# Patient Record
Sex: FEMALE | Race: WHITE | NOT HISPANIC OR LATINO | Employment: PART TIME | URBAN - NONMETROPOLITAN AREA
[De-identification: names, ages, dates, MRNs, and addresses within clinical notes are randomized per-mention and may not be internally consistent; named-entity substitution may affect disease eponyms.]

---

## 2017-10-16 ENCOUNTER — OFFICE VISIT (OUTPATIENT)
Dept: URGENT CARE | Facility: CLINIC | Age: 52
End: 2017-10-16
Payer: COMMERCIAL

## 2017-10-16 PROCEDURE — 99203 OFFICE O/P NEW LOW 30 MIN: CPT

## 2017-10-19 NOTE — PROGRESS NOTES
Assessment  1  Forearm sprain (841 9) ([id number])    Discussion/Summary  Discussion Summary:   1) rest armapply ice 10-20minutes at a timetylenol/motrin for painace bandage as needed for compression  Medication Side Effects Reviewed: Possible side effects of new medications were reviewed with the patient/guardian today  Understands and agrees with treatment plan: The treatment plan was reviewed with the patient/guardian  The patient/guardian understands and agrees with the treatment plan   Counseling Documentation With Imm: The patient, patient's family was counseled regarding instructions for management,-- patient and family education,-- importance of compliance with treatment  Chief Complaint  1  Arm Pain    History of Present Illness  HPI: 46yo F p/w pain in R forearm x 1 day  Pt carries large bag on that arm  Pt states she hears clicking nose when she rotates her arm  Pt has not attempted any palliative treatments  Hospital Based Practices Required Assessment:   Pain Assessment   the patient states they have pain  The pain is located in the right forearm  The patient describes the pain as popping  (on a scale of 0 to 10, the patient rates the pain at 5 )   Abuse And Domestic Violence Screen    Yes, the patient is safe at home  -- The patient states no one is hurting them  Depression And Suicide Screen  No, the patient has not had thoughts of hurting themself  No, the patient has not felt depressed in the past 7 days  Prefered Language is  [place]  Primary Language is  English  Review of Systems  Focused-Female:   Constitutional: No fever, no chills, feels well, no tiredness, no recent weight gain or loss  ENT: no ear ache, no loss of hearing, no nosebleeds or nasal discharge, no sore throat or hoarseness  Cardiovascular: no complaints of slow or fast heart rate, no chest pain, no palpitations, no leg claudication or lower extremity edema     Respiratory: no complaints of shortness of breath, no wheezing, no dyspnea on exertion, no orthopnea or PND  Breasts: no complaints of breast pain, breast lump or nipple discharge  Gastrointestinal: no complaints of abdominal pain, no constipation, no nausea or diarrhea, no vomiting, no bloody stools  Genitourinary: no complaints of dysuria, no incontinence, no pelvic pain, no dysmenorrhea, no vaginal discharge or abnormal vaginal bleeding  Musculoskeletal: arthralgias-- and-- myalgias, but-- no joint swelling,-- no limb pain,-- no joint stiffness-- and-- no limb swelling  Integumentary: no complaints of skin rash or lesion, no itching or dry skin, no skin wounds  Neurological: no complaints of headache, no confusion, no numbness or tingling, no dizziness or fainting  ROS Reviewed:   ROS reviewed  Active Problems  1  Acute upper respiratory infection (465 9) (J06 9)   2  Anxiety disorder (300 00) (F41 9)   3  Cough (786 2) (R05)   4  Hypothyroidism (244 9) (E03 9)   5  Panic disorder (300 01) (F41 0)    Past Medical History  1  History of Cough (786 2) (R05)   2  History of acute bronchitis (V12 69) (Z87 09)   3  No pertinent past medical history  Active Problems And Past Medical History Reviewed: The active problems and past medical history were reviewed and updated today  Family History  Family History Reviewed: The family history was reviewed and updated today  Social History   · Current every day smoker (305 1) (F17 200)   · No alcohol use   · No illicit drug use  Social History Reviewed: The social history was reviewed and is unchanged  Surgical History  1  History of Hysterectomy   2  History of Tonsillectomy  Surgical History Reviewed: The surgical history was reviewed and updated today  Current Meds   1  Synthroid TABS; Therapy: (Recorded:[id number]) to Recorded  Medication List Reviewed: The medication list was reviewed and updated today  Allergies  1   No Known Drug Allergies    Vitals  Signs Recorded: 45ZSJ1731 02:08PM   Temperature: 97 2 F  Heart Rate: 85  Respiration: 20  Systolic: 356, LUE, Sitting  Diastolic: 83, LUE, Sitting  BP Cuff Size: Large  Height: 5 ft 7 in  Weight: 176 lb 4 oz  BMI Calculated: 27 6  BSA Calculated: 1 92  O2 Saturation: 99  Pain Scale: 5    Physical Exam    Constitutional   General appearance: No acute distress, well appearing and well nourished  Pulmonary   Respiratory effort: No increased work of breathing or signs of respiratory distress  Auscultation of lungs: Clear to auscultation  no rales or crackles were heard bilaterally  no rhonchi  no friction rub  no wheezing  Cardiovascular   Palpation of heart: Normal PMI, no thrills  Auscultation of heart: Normal rate and rhythm, normal S1 and S2, without murmurs  Examination of extremities for edema and/or varicosities: Normal     Abdomen   Abdomen: Non-tender, no masses  Liver and spleen: No hepatomegaly or splenomegaly  Musculoskeletal   Gait and station: Normal     Digits and nails: Normal without clubbing or cyanosis  Inspection/palpation of joints, bones, and muscles: Abnormal  -- mild edema and TTP over proximal aspect of R forearm  Skin   Skin and subcutaneous tissue: Normal without rashes or lesions  Neurologic   Cranial nerves: Cranial nerves 2-12 intact  Reflexes: 2+ and symmetric  Sensation: No sensory loss  -- pt vascularly and neurologically intact     Psychiatric   Orientation to person, place, and time: Normal     Mood and affect: Normal        Signatures   Electronically signed by : GLENDA Buitrago; Oct 17 2017  9:27AM EST                       (Author)    Electronically signed by : Melecio Kussmaul, D O ; Oct 18 2017 10:21AM EST                       (Co-author)

## 2018-01-29 ENCOUNTER — OFFICE VISIT (OUTPATIENT)
Dept: URGENT CARE | Facility: CLINIC | Age: 53
End: 2018-01-29
Payer: COMMERCIAL

## 2018-01-29 ENCOUNTER — APPOINTMENT (OUTPATIENT)
Dept: RADIOLOGY | Facility: CLINIC | Age: 53
End: 2018-01-29
Payer: COMMERCIAL

## 2018-01-29 VITALS
BODY MASS INDEX: 27.47 KG/M2 | TEMPERATURE: 98.4 F | RESPIRATION RATE: 18 BRPM | HEART RATE: 94 BPM | WEIGHT: 175 LBS | SYSTOLIC BLOOD PRESSURE: 112 MMHG | DIASTOLIC BLOOD PRESSURE: 70 MMHG | HEIGHT: 67 IN | OXYGEN SATURATION: 98 %

## 2018-01-29 DIAGNOSIS — R07.9 CHEST PAIN, UNSPECIFIED TYPE: ICD-10-CM

## 2018-01-29 DIAGNOSIS — R05.9 COUGH: ICD-10-CM

## 2018-01-29 DIAGNOSIS — J20.9 ACUTE BRONCHITIS, UNSPECIFIED ORGANISM: Primary | ICD-10-CM

## 2018-01-29 PROCEDURE — 93005 ELECTROCARDIOGRAM TRACING: CPT | Performed by: PHYSICIAN ASSISTANT

## 2018-01-29 PROCEDURE — G0383 LEV 4 HOSP TYPE B ED VISIT: HCPCS | Performed by: PHYSICIAN ASSISTANT

## 2018-01-29 PROCEDURE — 71046 X-RAY EXAM CHEST 2 VIEWS: CPT

## 2018-01-29 RX ORDER — AMOXICILLIN 400 MG/5ML
10 POWDER, FOR SUSPENSION ORAL 2 TIMES DAILY
Qty: 200 ML | Refills: 0 | Status: SHIPPED | OUTPATIENT
Start: 2018-01-29 | End: 2018-02-08

## 2018-01-29 RX ORDER — LEVOTHYROXINE SODIUM 137 UG/1
137 TABLET ORAL DAILY
COMMUNITY
End: 2018-08-17

## 2018-01-29 NOTE — PROGRESS NOTES
Assessment/Plan:      Diagnoses and all orders for this visit:    Acute bronchitis, unspecified organism  -     nystatin (MYCOSTATIN) 100,000 units/mL suspension; Apply 5 mL (500,000 Units total) to the mouth or throat 4 (four) times a day  -     amoxicillin (AMOXIL) 400 MG/5ML suspension; Take 10 mL (800 mg total) by mouth 2 (two) times a day for 10 days    Chest pain, unspecified type  Comments:  EKG showed normal sinus rhythm at 83 beats per minute  Reviewed prior EKG from September of 2015 and there appears to be no significant change from prior  Orders:  -     POCT ECG    Cough  -     X-ray chest 2 views    Other orders  -     levothyroxine (SYNTHROID) 137 mcg tablet; Take by mouth        Patient Instructions   X-ray negative for any pulmonary disease  Will follow up with x-ray report when available  Start antibiotic intake as directed  Prescribed nystatin swish and swallow for patient because she has thrush when she takes an antibiotic  Patient is unable swallow pills so prescribe liquid for her  Chest pain appears more muscular in nature  Patient informed to go to the emergency room if pain is worsening  Otherwise follow up with PCP in the next 7-10 days  Subjective:     Patient ID: Luc Estes is a 47 y o  female  Chief Complaint   Patient presents with    Chest Pain     C/O mid sternal chest pain which radiated into right shoulder blade and alot of gas 3 days ago  The pain has improved from a stabbing pain to a soreness today  pt has now noticed some head and chest congestion and loose cough  79-year-old female here with complaint of midsternal sharp chest pain which radiated to her right shoulder blade that started 3 days ago  She states that the pain is lessening and now feels just sore in that area  Reports having chest congestion and a loose cough  No fever or chills  No palpitations, diaphoresis, nausea or vomiting          Review of Systems   Constitutional: Negative for activity change, appetite change, chills, diaphoresis, fatigue, fever and unexpected weight change  HENT: Positive for congestion  Negative for dental problem, hearing loss, sinus pressure, sneezing, sore throat, tinnitus, trouble swallowing and voice change  Eyes: Negative for photophobia, redness and visual disturbance  Respiratory: Positive for cough  Negative for apnea, chest tightness, shortness of breath, wheezing and stridor  Cardiovascular: Positive for chest pain  Negative for palpitations and leg swelling  Gastrointestinal: Negative for abdominal distention, abdominal pain, blood in stool, constipation, diarrhea, nausea and vomiting  Endocrine: Negative for cold intolerance, heat intolerance, polydipsia, polyphagia and polyuria  Genitourinary: Negative for difficulty urinating, dysuria, flank pain, frequency, hematuria and urgency  Musculoskeletal: Negative for arthralgias, back pain, gait problem, joint swelling, myalgias, neck pain and neck stiffness  Skin: Negative for pallor, rash and wound  Neurological: Negative for dizziness, tremors, seizures, speech difficulty, weakness and headaches  Hematological: Negative for adenopathy  Does not bruise/bleed easily  Psychiatric/Behavioral: Negative for agitation, confusion, dysphoric mood and sleep disturbance  The patient is not nervous/anxious  All other systems reviewed and are negative  Objective:  /70   Pulse 94   Temp 98 4 °F (36 9 °C)   Resp 18   Ht 5' 7" (1 702 m)   Wt 79 4 kg (175 lb)   SpO2 98%   BMI 27 41 kg/m²      Physical Exam   Constitutional: She appears well-developed and well-nourished  No distress  HENT:   Head: Normocephalic  Right Ear: External ear normal    Left Ear: External ear normal    Nose: Nose normal    Mouth/Throat: Oropharynx is clear and moist  No oropharyngeal exudate  Neck: Normal range of motion  Neck supple     Cardiovascular: Normal rate, regular rhythm and normal heart sounds  No murmur heard  Pulmonary/Chest: Effort normal  No respiratory distress  She has wheezes in the right upper field, the right middle field and the right lower field  She has no rales  Abdominal: Soft  Bowel sounds are normal  There is no tenderness  Musculoskeletal: Normal range of motion  Lymphadenopathy:     She has no cervical adenopathy  Skin: Skin is warm  No rash noted

## 2018-01-29 NOTE — PATIENT INSTRUCTIONS
X-ray negative for any pulmonary disease  Will follow up with x-ray report when available  Start antibiotic intake as directed  Prescribed nystatin swish and swallow for patient because she has thrush when she takes an antibiotic  Patient is unable swallow pills so prescribe liquid for her  Chest pain appears more muscular in nature  Patient informed to go to the emergency room if pain is worsening  Otherwise follow up with PCP in the next 7-10 days

## 2018-01-31 LAB
ATRIAL RATE: 83 BPM
P AXIS: 66 DEGREES
PR INTERVAL: 150 MS
QRS AXIS: 20 DEGREES
QRSD INTERVAL: 76 MS
QT INTERVAL: 378 MS
QTC INTERVAL: 444 MS
T WAVE AXIS: 40 DEGREES
VENTRICULAR RATE: 83 BPM

## 2018-02-08 ENCOUNTER — TRANSCRIBE ORDERS (OUTPATIENT)
Dept: LAB | Facility: MEDICAL CENTER | Age: 53
End: 2018-02-08

## 2018-02-08 ENCOUNTER — APPOINTMENT (OUTPATIENT)
Dept: LAB | Facility: MEDICAL CENTER | Age: 53
End: 2018-02-08
Payer: COMMERCIAL

## 2018-02-08 DIAGNOSIS — F41.9 ANXIETY: ICD-10-CM

## 2018-02-08 DIAGNOSIS — F41.9 ANXIETY: Primary | ICD-10-CM

## 2018-02-08 LAB
ANION GAP SERPL CALCULATED.3IONS-SCNC: 6 MMOL/L (ref 4–13)
BUN SERPL-MCNC: 17 MG/DL (ref 5–25)
CALCIUM SERPL-MCNC: 9.1 MG/DL (ref 8.3–10.1)
CHLORIDE SERPL-SCNC: 107 MMOL/L (ref 100–108)
CO2 SERPL-SCNC: 25 MMOL/L (ref 21–32)
CREAT SERPL-MCNC: 0.98 MG/DL (ref 0.6–1.3)
GFR SERPL CREATININE-BSD FRML MDRD: 66 ML/MIN/1.73SQ M
GLUCOSE SERPL-MCNC: 101 MG/DL (ref 65–140)
POTASSIUM SERPL-SCNC: 3.9 MMOL/L (ref 3.5–5.3)
SODIUM SERPL-SCNC: 138 MMOL/L (ref 136–145)
TSH SERPL DL<=0.05 MIU/L-ACNC: 1.41 UIU/ML (ref 0.36–3.74)

## 2018-02-08 PROCEDURE — 80048 BASIC METABOLIC PNL TOTAL CA: CPT

## 2018-02-08 PROCEDURE — 36415 COLL VENOUS BLD VENIPUNCTURE: CPT

## 2018-02-08 PROCEDURE — 84443 ASSAY THYROID STIM HORMONE: CPT

## 2018-03-16 ENCOUNTER — OFFICE VISIT (OUTPATIENT)
Dept: URGENT CARE | Facility: CLINIC | Age: 53
End: 2018-03-16
Payer: COMMERCIAL

## 2018-03-16 VITALS
HEIGHT: 67 IN | WEIGHT: 175.04 LBS | BODY MASS INDEX: 27.47 KG/M2 | HEART RATE: 82 BPM | DIASTOLIC BLOOD PRESSURE: 78 MMHG | OXYGEN SATURATION: 97 % | SYSTOLIC BLOOD PRESSURE: 122 MMHG | RESPIRATION RATE: 18 BRPM | TEMPERATURE: 96.7 F

## 2018-03-16 DIAGNOSIS — J01.10 ACUTE FRONTAL SINUSITIS, RECURRENCE NOT SPECIFIED: Primary | ICD-10-CM

## 2018-03-16 PROCEDURE — G0382 LEV 3 HOSP TYPE B ED VISIT: HCPCS | Performed by: NURSE PRACTITIONER

## 2018-03-16 RX ORDER — AMOXICILLIN 400 MG/5ML
POWDER, FOR SUSPENSION ORAL
Qty: 200 ML | Refills: 0 | Status: SHIPPED | OUTPATIENT
Start: 2018-03-16 | End: 2018-03-16

## 2018-03-16 NOTE — PROGRESS NOTES
[facility]        NAME: Regla Abrams is a 47 y o  female  : 1965    MRN: [id number]  DATE: 2018  TIME: 1:21 PM    Assessment and Plan   Acute frontal sinusitis, recurrence not specified [J01 10]  1  Acute frontal sinusitis, recurrence not specified  amoxicillin (AMOXIL) 400 MG/5ML suspension         Patient Instructions       Follow up with PCP in 3-5 days  Proceed to  ER if symptoms worsen  I have prescribed an antibiotic for the infection  Please take the antibiotic as prescribed and finish the entire prescription  I recommend that the patient takes an over the counter probiotic or eats yogurt with live cultures in it [place]) to keep good bacteria in the gut and help prevent diarrhea  Wash hands frequently to prevent the spread of infection  Can use over the counter cough and cold medications to help with symptoms  Ibuprofen and/or tylenol as needed for pain or fever  If not improving over the next 7-10 days, follow up with PCP  Chief Complaint     Chief Complaint   Patient presents with    Cold Like Symptoms     C/O pain in left thoracic area x 4 days, harsh cough, sinus pressure, post nasal drip, nausea,and diarrhea x 2 days         History of Present Illness       24-year-old female at urgent care with chief complaint of sinus pressure, sinus congestion, postnasal drainage and chills for the past 4 days  Also states she has cough the past 4 days denies any shortness of breath or chest congestion  But is complaining of left sided mid back pain which she states is worse after coughing  Has not used any over-the-counter medications reports no improvement in symptoms        Review of Systems   Review of Systems   Constitutional: Negative  HENT: Positive for congestion, postnasal drip, rhinorrhea, sinus pain, sinus pressure and sore throat   Negative for dental problem, drooling, ear discharge, ear pain, facial swelling, hearing loss, mouth sores, nosebleeds, sneezing, tinnitus, trouble swallowing and voice change  Eyes: Negative  Respiratory: Positive for cough  Negative for apnea, choking, chest tightness, shortness of breath, wheezing and stridor  Cardiovascular: Negative for chest pain, palpitations and leg swelling  Gastrointestinal: Negative  Negative for abdominal distention, abdominal pain, anal bleeding, blood in stool, constipation, diarrhea, nausea, rectal pain and vomiting  Endocrine: Negative  Genitourinary: Negative  Musculoskeletal: Positive for back pain  Skin: Negative  Allergic/Immunologic: Negative  Neurological: Negative  Hematological: Negative  Psychiatric/Behavioral: Negative  Current Medications       Current Outpatient Prescriptions:     amoxicillin (AMOXIL) 400 MG/5ML suspension, Take 10 ml po bid for 10 days, Disp: 200 mL, Rfl: 0    levothyroxine (SYNTHROID) 137 mcg tablet, Take by mouth, Disp: , Rfl:     Current Allergies     Allergies as of 03/16/2018    (No Known Allergies)            The following portions of the patient's history were reviewed and updated as appropriate: allergies, current medications, past family history, past medical history, past social history, past surgical history and problem list      Past Medical History:   Diagnosis Date    Asthma     Hypothyroid     IBS (irritable bowel syndrome)        Past Surgical History:   Procedure Laterality Date    HYSTERECTOMY      TONSILLECTOMY         No family history on file  Medications have been verified  Objective   /78   Pulse 82   Temp (!) 96 7 °F (35 9 °C)   Resp 18   Ht 5' 7 01" (1 702 m)   Wt 79 4 kg (175 lb 0 7 oz)   SpO2 97%   BMI 27 41 kg/m²        Physical Exam     Physical Exam   Constitutional: She is oriented to person, place, and time  Vital signs are normal  She appears well-developed and well-nourished  HENT:   Head: Normocephalic and atraumatic     Right Ear: Hearing, tympanic membrane, external ear and ear canal normal    Left Ear: Hearing, tympanic membrane, external ear and ear canal normal    Nose: Rhinorrhea and sinus tenderness present  Right sinus exhibits frontal sinus tenderness  Left sinus exhibits frontal sinus tenderness  Mouth/Throat: Uvula is midline and mucous membranes are normal  Posterior oropharyngeal erythema present  Eyes: Conjunctivae and EOM are normal  Pupils are equal, round, and reactive to light  Neck: Trachea normal, normal range of motion and full passive range of motion without pain  Cardiovascular: Normal rate and regular rhythm  Pulmonary/Chest: Effort normal and breath sounds normal    Abdominal: Soft  Normal appearance  Musculoskeletal: Normal range of motion  Back:    Lymphadenopathy:     She has cervical adenopathy  Right cervical: Superficial cervical adenopathy present  Left cervical: Superficial cervical adenopathy present  Neurological: She is alert and oriented to person, place, and time

## 2018-08-17 ENCOUNTER — HOSPITAL ENCOUNTER (OUTPATIENT)
Facility: HOSPITAL | Age: 53
Setting detail: OBSERVATION
Discharge: HOME | End: 2018-08-18
Attending: SURGERY | Admitting: SURGERY
Payer: COMMERCIAL

## 2018-08-17 ENCOUNTER — ANESTHESIA (EMERGENCY)
Dept: PERIOP | Facility: HOSPITAL | Age: 53
End: 2018-08-17
Payer: COMMERCIAL

## 2018-08-17 ENCOUNTER — APPOINTMENT (EMERGENCY)
Dept: CT IMAGING | Facility: HOSPITAL | Age: 53
End: 2018-08-17
Payer: COMMERCIAL

## 2018-08-17 ENCOUNTER — HOSPITAL ENCOUNTER (EMERGENCY)
Facility: HOSPITAL | Age: 53
End: 2018-08-17
Attending: EMERGENCY MEDICINE | Admitting: EMERGENCY MEDICINE
Payer: COMMERCIAL

## 2018-08-17 ENCOUNTER — ANESTHESIA EVENT (EMERGENCY)
Dept: PERIOP | Facility: HOSPITAL | Age: 53
End: 2018-08-17
Payer: COMMERCIAL

## 2018-08-17 VITALS
DIASTOLIC BLOOD PRESSURE: 65 MMHG | RESPIRATION RATE: 18 BRPM | WEIGHT: 165.12 LBS | SYSTOLIC BLOOD PRESSURE: 109 MMHG | HEART RATE: 78 BPM | BODY MASS INDEX: 25.86 KG/M2 | OXYGEN SATURATION: 95 % | TEMPERATURE: 99.1 F

## 2018-08-17 DIAGNOSIS — K35.80 ACUTE APPENDICITIS: Primary | ICD-10-CM

## 2018-08-17 DIAGNOSIS — K35.30 ACUTE APPENDICITIS WITH LOCALIZED PERITONITIS: Primary | ICD-10-CM

## 2018-08-17 LAB
ALBUMIN SERPL BCP-MCNC: 3.9 G/DL (ref 3.5–5)
ALP SERPL-CCNC: 90 U/L (ref 46–116)
ALT SERPL W P-5'-P-CCNC: 20 U/L (ref 12–78)
ANION GAP SERPL CALCULATED.3IONS-SCNC: 8 MMOL/L (ref 4–13)
AST SERPL W P-5'-P-CCNC: 12 U/L (ref 5–45)
ATRIAL RATE: 79 BPM
BASOPHILS # BLD AUTO: 0.04 THOUSANDS/ΜL (ref 0–0.1)
BASOPHILS NFR BLD AUTO: 0 % (ref 0–1)
BILIRUB SERPL-MCNC: 0.3 MG/DL (ref 0.2–1)
BILIRUB UR QL STRIP: NEGATIVE
BUN SERPL-MCNC: 16 MG/DL (ref 5–25)
CALCIUM SERPL-MCNC: 9.3 MG/DL (ref 8.3–10.1)
CHLORIDE SERPL-SCNC: 104 MMOL/L (ref 100–108)
CLARITY UR: NORMAL
CO2 SERPL-SCNC: 26 MMOL/L (ref 21–32)
COLOR UR: YELLOW
CREAT SERPL-MCNC: 0.91 MG/DL (ref 0.6–1.3)
EOSINOPHIL # BLD AUTO: 0.03 THOUSAND/ΜL (ref 0–0.61)
EOSINOPHIL NFR BLD AUTO: 0 % (ref 0–6)
ERYTHROCYTE [DISTWIDTH] IN BLOOD BY AUTOMATED COUNT: 13.7 % (ref 11.6–15.1)
GFR SERPL CREATININE-BSD FRML MDRD: 72 ML/MIN/1.73SQ M
GLUCOSE SERPL-MCNC: 110 MG/DL (ref 65–140)
GLUCOSE UR STRIP-MCNC: NEGATIVE MG/DL
HCT VFR BLD AUTO: 42.6 % (ref 34.8–46.1)
HGB BLD-MCNC: 14 G/DL (ref 11.5–15.4)
HGB UR QL STRIP.AUTO: NEGATIVE
HOLD SPECIMEN: NORMAL
IMM GRANULOCYTES # BLD AUTO: 0.06 THOUSAND/UL (ref 0–0.2)
IMM GRANULOCYTES NFR BLD AUTO: 0 % (ref 0–2)
KETONES UR STRIP-MCNC: NEGATIVE MG/DL
LEUKOCYTE ESTERASE UR QL STRIP: NEGATIVE
LIPASE SERPL-CCNC: 170 U/L (ref 73–393)
LYMPHOCYTES # BLD AUTO: 2.24 THOUSANDS/ΜL (ref 0.6–4.47)
LYMPHOCYTES NFR BLD AUTO: 13 % (ref 14–44)
MCH RBC QN AUTO: 30.7 PG (ref 26.8–34.3)
MCHC RBC AUTO-ENTMCNC: 32.9 G/DL (ref 31.4–37.4)
MCV RBC AUTO: 93 FL (ref 82–98)
MONOCYTES # BLD AUTO: 1.07 THOUSAND/ΜL (ref 0.17–1.22)
MONOCYTES NFR BLD AUTO: 6 % (ref 4–12)
NEUTROPHILS # BLD AUTO: 13.52 THOUSANDS/ΜL (ref 1.85–7.62)
NEUTS SEG NFR BLD AUTO: 81 % (ref 43–75)
NITRITE UR QL STRIP: NEGATIVE
NRBC BLD AUTO-RTO: 0 /100 WBCS
P AXIS: 76 DEGREES
PH UR STRIP.AUTO: 6.5 [PH] (ref 4.5–8)
PLATELET # BLD AUTO: 316 THOUSANDS/UL (ref 149–390)
PMV BLD AUTO: 9.7 FL (ref 8.9–12.7)
POTASSIUM SERPL-SCNC: 3.8 MMOL/L (ref 3.5–5.3)
PR INTERVAL: 144 MS
PROT SERPL-MCNC: 7.2 G/DL (ref 6.4–8.2)
PROT UR STRIP-MCNC: NEGATIVE MG/DL
QRS AXIS: 37 DEGREES
QRSD INTERVAL: 76 MS
QT INTERVAL: 384 MS
QTC INTERVAL: 440 MS
RBC # BLD AUTO: 4.56 MILLION/UL (ref 3.81–5.12)
SODIUM SERPL-SCNC: 138 MMOL/L (ref 136–145)
SP GR UR STRIP.AUTO: 1.02 (ref 1–1.03)
T WAVE AXIS: 44 DEGREES
TROPONIN I SERPL-MCNC: <0.02 NG/ML
UROBILINOGEN UR QL STRIP.AUTO: 0.2 E.U./DL
VENTRICULAR RATE: 79 BPM
WBC # BLD AUTO: 16.96 THOUSAND/UL (ref 4.31–10.16)

## 2018-08-17 PROCEDURE — 84484 ASSAY OF TROPONIN QUANT: CPT | Performed by: EMERGENCY MEDICINE

## 2018-08-17 PROCEDURE — 88304 TISSUE EXAM BY PATHOLOGIST: CPT | Performed by: PATHOLOGY

## 2018-08-17 PROCEDURE — 96365 THER/PROPH/DIAG IV INF INIT: CPT

## 2018-08-17 PROCEDURE — 85025 COMPLETE CBC W/AUTO DIFF WBC: CPT | Performed by: EMERGENCY MEDICINE

## 2018-08-17 PROCEDURE — 99285 EMERGENCY DEPT VISIT HI MDM: CPT

## 2018-08-17 PROCEDURE — 81003 URINALYSIS AUTO W/O SCOPE: CPT | Performed by: EMERGENCY MEDICINE

## 2018-08-17 PROCEDURE — 96361 HYDRATE IV INFUSION ADD-ON: CPT

## 2018-08-17 PROCEDURE — 80053 COMPREHEN METABOLIC PANEL: CPT | Performed by: EMERGENCY MEDICINE

## 2018-08-17 PROCEDURE — 93005 ELECTROCARDIOGRAM TRACING: CPT

## 2018-08-17 PROCEDURE — 74177 CT ABD & PELVIS W/CONTRAST: CPT

## 2018-08-17 PROCEDURE — 93010 ELECTROCARDIOGRAM REPORT: CPT | Performed by: INTERNAL MEDICINE

## 2018-08-17 PROCEDURE — 83690 ASSAY OF LIPASE: CPT | Performed by: EMERGENCY MEDICINE

## 2018-08-17 PROCEDURE — 44970 LAPAROSCOPY APPENDECTOMY: CPT | Performed by: SURGERY

## 2018-08-17 PROCEDURE — 36415 COLL VENOUS BLD VENIPUNCTURE: CPT | Performed by: EMERGENCY MEDICINE

## 2018-08-17 PROCEDURE — 99219 PR INITIAL OBSERVATION CARE/DAY 50 MINUTES: CPT | Performed by: SURGERY

## 2018-08-17 PROCEDURE — 96374 THER/PROPH/DIAG INJ IV PUSH: CPT

## 2018-08-17 RX ORDER — METOCLOPRAMIDE HYDROCHLORIDE 5 MG/ML
10 INJECTION INTRAMUSCULAR; INTRAVENOUS ONCE AS NEEDED
Status: DISCONTINUED | OUTPATIENT
Start: 2018-08-17 | End: 2018-08-17 | Stop reason: HOSPADM

## 2018-08-17 RX ORDER — ONDANSETRON 2 MG/ML
INJECTION INTRAMUSCULAR; INTRAVENOUS AS NEEDED
Status: DISCONTINUED | OUTPATIENT
Start: 2018-08-17 | End: 2018-08-17 | Stop reason: SURG

## 2018-08-17 RX ORDER — MAGNESIUM HYDROXIDE 1200 MG/15ML
LIQUID ORAL AS NEEDED
Status: DISCONTINUED | OUTPATIENT
Start: 2018-08-17 | End: 2018-08-17 | Stop reason: HOSPADM

## 2018-08-17 RX ORDER — FENTANYL CITRATE 50 UG/ML
INJECTION, SOLUTION INTRAMUSCULAR; INTRAVENOUS AS NEEDED
Status: DISCONTINUED | OUTPATIENT
Start: 2018-08-17 | End: 2018-08-17 | Stop reason: SURG

## 2018-08-17 RX ORDER — ACETAMINOPHEN 160 MG/5ML
650 SUSPENSION, ORAL (FINAL DOSE FORM) ORAL ONCE
Status: COMPLETED | OUTPATIENT
Start: 2018-08-17 | End: 2018-08-17

## 2018-08-17 RX ORDER — SODIUM CHLORIDE 9 MG/ML
125 INJECTION, SOLUTION INTRAVENOUS CONTINUOUS
Status: DISCONTINUED | OUTPATIENT
Start: 2018-08-17 | End: 2018-08-17

## 2018-08-17 RX ORDER — SODIUM CHLORIDE, SODIUM LACTATE, POTASSIUM CHLORIDE, CALCIUM CHLORIDE 600; 310; 30; 20 MG/100ML; MG/100ML; MG/100ML; MG/100ML
INJECTION, SOLUTION INTRAVENOUS CONTINUOUS PRN
Status: DISCONTINUED | OUTPATIENT
Start: 2018-08-17 | End: 2018-08-17 | Stop reason: SURG

## 2018-08-17 RX ORDER — FENTANYL CITRATE/PF 50 MCG/ML
25 SYRINGE (ML) INJECTION
Status: DISCONTINUED | OUTPATIENT
Start: 2018-08-17 | End: 2018-08-17 | Stop reason: HOSPADM

## 2018-08-17 RX ORDER — DEXMEDETOMIDINE HYDROCHLORIDE 100 UG/ML
INJECTION, SOLUTION INTRAVENOUS AS NEEDED
Status: DISCONTINUED | OUTPATIENT
Start: 2018-08-17 | End: 2018-08-17 | Stop reason: SURG

## 2018-08-17 RX ORDER — NICOTINE 21 MG/24HR
21 PATCH, TRANSDERMAL 24 HOURS TRANSDERMAL ONCE AS NEEDED
Status: DISCONTINUED | OUTPATIENT
Start: 2018-08-17 | End: 2018-08-17 | Stop reason: HOSPADM

## 2018-08-17 RX ORDER — LEVOTHYROXINE SODIUM 0.1 MG/1
100 TABLET ORAL DAILY
COMMUNITY
End: 2018-12-20

## 2018-08-17 RX ORDER — LIDOCAINE HYDROCHLORIDE 10 MG/ML
INJECTION, SOLUTION INFILTRATION; PERINEURAL AS NEEDED
Status: DISCONTINUED | OUTPATIENT
Start: 2018-08-17 | End: 2018-08-17 | Stop reason: SURG

## 2018-08-17 RX ORDER — SODIUM CHLORIDE, SODIUM LACTATE, POTASSIUM CHLORIDE, CALCIUM CHLORIDE 600; 310; 30; 20 MG/100ML; MG/100ML; MG/100ML; MG/100ML
100 INJECTION, SOLUTION INTRAVENOUS CONTINUOUS
Status: DISCONTINUED | OUTPATIENT
Start: 2018-08-17 | End: 2018-08-18

## 2018-08-17 RX ORDER — PROPOFOL 10 MG/ML
INJECTION, EMULSION INTRAVENOUS AS NEEDED
Status: DISCONTINUED | OUTPATIENT
Start: 2018-08-17 | End: 2018-08-17 | Stop reason: SURG

## 2018-08-17 RX ORDER — ONDANSETRON 2 MG/ML
4 INJECTION INTRAMUSCULAR; INTRAVENOUS EVERY 6 HOURS PRN
Status: DISCONTINUED | OUTPATIENT
Start: 2018-08-17 | End: 2018-08-18 | Stop reason: HOSPADM

## 2018-08-17 RX ORDER — BUPIVACAINE HYDROCHLORIDE AND EPINEPHRINE 5; 5 MG/ML; UG/ML
INJECTION, SOLUTION PERINEURAL AS NEEDED
Status: DISCONTINUED | OUTPATIENT
Start: 2018-08-17 | End: 2018-08-17 | Stop reason: HOSPADM

## 2018-08-17 RX ORDER — ROCURONIUM BROMIDE 10 MG/ML
INJECTION, SOLUTION INTRAVENOUS AS NEEDED
Status: DISCONTINUED | OUTPATIENT
Start: 2018-08-17 | End: 2018-08-17 | Stop reason: SURG

## 2018-08-17 RX ORDER — NICOTINE 21 MG/24HR
21 PATCH, TRANSDERMAL 24 HOURS TRANSDERMAL DAILY
Status: DISCONTINUED | OUTPATIENT
Start: 2018-08-17 | End: 2018-08-18 | Stop reason: HOSPADM

## 2018-08-17 RX ORDER — PROPOFOL 10 MG/ML
INJECTION, EMULSION INTRAVENOUS CONTINUOUS PRN
Status: DISCONTINUED | OUTPATIENT
Start: 2018-08-17 | End: 2018-08-17

## 2018-08-17 RX ORDER — KETOROLAC TROMETHAMINE 30 MG/ML
15 INJECTION, SOLUTION INTRAMUSCULAR; INTRAVENOUS ONCE
Status: COMPLETED | OUTPATIENT
Start: 2018-08-17 | End: 2018-08-17

## 2018-08-17 RX ORDER — ACETAMINOPHEN 325 MG/1
975 TABLET ORAL ONCE
Status: DISCONTINUED | OUTPATIENT
Start: 2018-08-17 | End: 2018-08-17

## 2018-08-17 RX ORDER — MIDAZOLAM HYDROCHLORIDE 1 MG/ML
INJECTION INTRAMUSCULAR; INTRAVENOUS AS NEEDED
Status: DISCONTINUED | OUTPATIENT
Start: 2018-08-17 | End: 2018-08-17 | Stop reason: SURG

## 2018-08-17 RX ORDER — SUCCINYLCHOLINE CHLORIDE 20 MG/ML
INJECTION INTRAMUSCULAR; INTRAVENOUS AS NEEDED
Status: DISCONTINUED | OUTPATIENT
Start: 2018-08-17 | End: 2018-08-17 | Stop reason: SURG

## 2018-08-17 RX ORDER — LEVOTHYROXINE SODIUM 0.1 MG/1
100 TABLET ORAL
Status: DISCONTINUED | OUTPATIENT
Start: 2018-08-18 | End: 2018-08-18 | Stop reason: HOSPADM

## 2018-08-17 RX ORDER — GLYCOPYRROLATE 0.2 MG/ML
INJECTION INTRAMUSCULAR; INTRAVENOUS AS NEEDED
Status: DISCONTINUED | OUTPATIENT
Start: 2018-08-17 | End: 2018-08-17 | Stop reason: SURG

## 2018-08-17 RX ORDER — KETOROLAC TROMETHAMINE 30 MG/ML
INJECTION, SOLUTION INTRAMUSCULAR; INTRAVENOUS AS NEEDED
Status: DISCONTINUED | OUTPATIENT
Start: 2018-08-17 | End: 2018-08-17 | Stop reason: SURG

## 2018-08-17 RX ORDER — ACETAMINOPHEN 160 MG/5ML
650 SUSPENSION, ORAL (FINAL DOSE FORM) ORAL EVERY 4 HOURS PRN
Status: DISCONTINUED | OUTPATIENT
Start: 2018-08-17 | End: 2018-08-18 | Stop reason: HOSPADM

## 2018-08-17 RX ADMIN — SUCCINYLCHOLINE CHLORIDE 100 MG: 20 INJECTION, SOLUTION INTRAMUSCULAR; INTRAVENOUS at 13:58

## 2018-08-17 RX ADMIN — ACETAMINOPHEN 650 MG: 160 SUSPENSION ORAL at 08:05

## 2018-08-17 RX ADMIN — GLYCOPYRROLATE 0.9 MG: 0.2 INJECTION, SOLUTION INTRAMUSCULAR; INTRAVENOUS at 14:38

## 2018-08-17 RX ADMIN — METRONIDAZOLE 500 MG: 500 INJECTION, SOLUTION INTRAVENOUS at 13:04

## 2018-08-17 RX ADMIN — KETOROLAC TROMETHAMINE 30 MG: 30 INJECTION, SOLUTION INTRAMUSCULAR at 14:40

## 2018-08-17 RX ADMIN — NEOSTIGMINE METHYLSULFATE 5 MG: 1 INJECTION, SOLUTION INTRAMUSCULAR; INTRAVENOUS; SUBCUTANEOUS at 14:38

## 2018-08-17 RX ADMIN — DEXMEDETOMIDINE HYDROCHLORIDE 12 MCG: 100 INJECTION, SOLUTION INTRAVENOUS at 14:20

## 2018-08-17 RX ADMIN — ROCURONIUM BROMIDE 5 MG: 10 INJECTION INTRAVENOUS at 13:58

## 2018-08-17 RX ADMIN — SODIUM CHLORIDE 1000 ML: 0.9 INJECTION, SOLUTION INTRAVENOUS at 09:55

## 2018-08-17 RX ADMIN — KETOROLAC TROMETHAMINE 15 MG: 30 INJECTION, SOLUTION INTRAMUSCULAR at 07:39

## 2018-08-17 RX ADMIN — IOHEXOL 100 ML: 350 INJECTION, SOLUTION INTRAVENOUS at 08:43

## 2018-08-17 RX ADMIN — SODIUM CHLORIDE, SODIUM LACTATE, POTASSIUM CHLORIDE, AND CALCIUM CHLORIDE: .6; .31; .03; .02 INJECTION, SOLUTION INTRAVENOUS at 14:44

## 2018-08-17 RX ADMIN — LIDOCAINE HYDROCHLORIDE 50 MG: 10 INJECTION, SOLUTION INFILTRATION; PERINEURAL at 13:58

## 2018-08-17 RX ADMIN — ROCURONIUM BROMIDE 30 MG: 10 INJECTION INTRAVENOUS at 14:06

## 2018-08-17 RX ADMIN — SODIUM CHLORIDE 125 ML/HR: 0.9 INJECTION, SOLUTION INTRAVENOUS at 12:31

## 2018-08-17 RX ADMIN — CEFAZOLIN SODIUM 2000 MG: 2 SOLUTION INTRAVENOUS at 13:55

## 2018-08-17 RX ADMIN — FENTANYL CITRATE 100 MCG: 50 INJECTION, SOLUTION INTRAMUSCULAR; INTRAVENOUS at 13:58

## 2018-08-17 RX ADMIN — ONDANSETRON 4 MG: 2 INJECTION INTRAMUSCULAR; INTRAVENOUS at 13:58

## 2018-08-17 RX ADMIN — PROPOFOL 200 MG: 10 INJECTION, EMULSION INTRAVENOUS at 13:58

## 2018-08-17 RX ADMIN — MIDAZOLAM 2 MG: 1 INJECTION INTRAMUSCULAR; INTRAVENOUS at 13:53

## 2018-08-17 NOTE — H&P
Acute Care Surgery  History and Physical  [place] Jaquan Gallego 47 y o  female MRN: [id number]  Unit/Bed#: ED 20 Encounter: [id number]    Assessment and Plan:  62-year-old female with acute appendicitis for 1 day    - Start Ancef/Flagyl  - OR for Laparoscopic Appendectomy      History of Present Illness     HPI:  Janice Tolliver is a 47 y o  female who presents with abdominal pain for the past 1 day  Patient states that she started experiencing heartburn yesterday afternoon  This heartburn progressed down into her abdomen around dinnertime or 6:00 p m  during this time  The patient began experiencing nausea with no vomiting  She began experiencing anorexia  She denies diarrhea  She has also been experiencing subjective fevers and chills  Patient's abdominal pain is described as sharp and radiates to her back  It is relieved by laying still  It is exacerbated by movement and bumps and direct pressure  Patient describes pain increase on car rides  Upon presentation to [facility] patient had a CT scan of her abdomen which did show evidence of acute appendicitis  Review of Systems   Constitutional: Positive for activity change, appetite change, chills and fever  HENT: Negative for congestion and rhinorrhea  Eyes: Negative for photophobia and visual disturbance  Respiratory: Negative for apnea, choking, chest tightness and wheezing  Cardiovascular: Negative for chest pain and palpitations  Gastrointestinal: Positive for abdominal pain and nausea  Negative for abdominal distention, blood in stool, constipation, diarrhea and vomiting  Endocrine: Negative for cold intolerance  Genitourinary: Negative for difficulty urinating and dysuria  Musculoskeletal: Negative for arthralgias and back pain  Skin: Negative for color change and pallor  Neurological: Negative for dizziness and headaches  Psychiatric/Behavioral: The patient is nervous/anxious             Historical Information   Past Medical History:   Diagnosis Date    Asthma     Hypothyroid     IBS (irritable bowel syndrome)      Past Surgical History:   Procedure Laterality Date    HYSTERECTOMY      TONSILLECTOMY       Social History   History   Alcohol Use No     History   Drug Use No     History   Smoking Status    Current Every Day Smoker    Types: Cigarettes   Smokeless Tobacco    Never Used     Family History: non-contributory    Meds/Allergies   all medications and allergies reviewed  No Known Allergies    Objective   First Vitals:   Blood Pressure: 120/64 (08/17/18 1230)  Pulse: 82 (08/17/18 1230)  Respirations: 20 (08/17/18 1230)  SpO2: 97 % (08/17/18 1230)    Current Vitals:   Blood Pressure: 108/70 (08/17/18 1300)  Pulse: 94 (08/17/18 1300)  Respirations: 20 (08/17/18 1300)  SpO2: 96 % (08/17/18 1300)    No intake or output data in the 24 hours ending 08/17/18 1305    Invasive Devices     Peripheral Intravenous Line            Peripheral IV 08/17/18 Right Antecubital less than 1 day                Physical Exam   Constitutional: She is oriented to person, place, and time  She appears well-developed and well-nourished  HENT:   Head: Normocephalic and atraumatic  Eyes: EOM are normal    Neck: Neck supple  Cardiovascular: Normal rate, regular rhythm and normal heart sounds  Pulmonary/Chest: Effort normal and breath sounds normal    Abdominal: Soft  Bowel sounds are normal  She exhibits no distension  There is tenderness  There is rebound  Musculoskeletal: Normal range of motion  Neurological: She is alert and oriented to person, place, and time  Psychiatric:   Patient is extremely anxious by default  Lab Results:   I have personally reviewed pertinent lab results    , CBC:   Lab Results   Component Value Date    WBC 16 96 (H) 08/17/2018    HGB 14 0 08/17/2018    HCT 42 6 08/17/2018    MCV 93 08/17/2018     08/17/2018    MCH 30 7 08/17/2018    MCHC 32 9 08/17/2018    RDW 13 7 08/17/2018    MPV 9 7 08/17/2018    NRBC 0 08/17/2018   , CMP:   Lab Results   Component Value Date     08/17/2018    K 3 8 08/17/2018     08/17/2018    CO2 26 08/17/2018    ANIONGAP 8 08/17/2018    BUN 16 08/17/2018    CREATININE 0 91 08/17/2018    GLUCOSE 110 08/17/2018    CALCIUM 9 3 08/17/2018    AST 12 08/17/2018    ALT 20 08/17/2018    ALKPHOS 90 08/17/2018    PROT 7 2 08/17/2018    BILITOT 0 30 08/17/2018    EGFR 72 08/17/2018     Imaging: I have personally reviewed pertinent films in PACS  EKG, Pathology, and Other Studies: I have personally reviewed pertinent reports  Code Status: No Order  Advance Directive and Living Will:      Power of :    POLST:      Counseling / Coordination of Care  Total floor / unit time spent today 45 minutes  This involved direct patient contact where I performed a full history and physical, reviewed previous records, and reviewed laboratory and other diagnostic studies  Greater than 50% of total time was spent with the patient and / or family counseling and / or coordination of care      Farzaneh Villela MD  8/17/2018

## 2018-08-17 NOTE — PALLIATIVE CARE CONFERENCE
Dr Gabriel Yu verbalized pt  to be discharged from PACU and transferred to [address] [place] [address] [address]

## 2018-08-17 NOTE — OP NOTE
OPERATIVE REPORT  PATIENT NAME: Yue Downey    :  1965  MRN: [id number]  Pt Location: [facility] OR ROOM 08    SURGERY DATE: 2018    Surgeon(s) and Role:     * Shailesh Blake MD - Assisting     * Yogesh Galeas MD - Primary    Preop Diagnosis:  Acute appendicitis with localized peritonitis [K35 3]    Post-Op Diagnosis Codes:     * Acute appendicitis with localized peritonitis [K35 3]    Procedure(s) (LRB):  APPENDECTOMY LAPAROSCOPIC (N/A)    Specimen(s):  ID Type Source Tests Collected by Time Destination   1 : appendix Tissue Appendix TISSUE EXAM Yogesh Galeas MD 2018 1430        Estimated Blood Loss:   5 mL    Drains:       Anesthesia Type:   General    Operative Indications:  Acute appendicitis with localized peritonitis [K35 3]      Operative Findings:    Non perforated appendicitis     Complications:   None    Procedure and Technique:    The patient was taken to the operative suite and placed supine on the operating room table  General endotracheal anesthesia was induced  Preoperative antibiotics were given in accordance with SCIP guidelines  The patient's abdomen was prepped and draped into a sterile field  A time-out was performed and all in attendance agreed to the correct patient and procedure  A 5 mm incision was made supraumbilically  A Veress needle was inserted into the skin defect in entry into the fascia was confirmed with positive saline drop test   Pneumoperitoneum was established to 15 mm of mercury  Initial insufflation pressures with 3 mm of mercury  A 5 mm trocar was inserted through the defect over a 30 degree 5 mm scope  A 5 mm trocar was also inserted in the suprapubic area  A 12 mm trocar was inserted in the left lower quadrant  The appendix was readily visible anterior to the cecum  The appendix was dilated, and appeared inflamed  The appendix was not perforated  The mesoappendix was thick and edematous    A window was created at the appendiceal cecal junction an avascular plane  A blue load cartridge 45 mm stapler was used to divide the appendiceal base  A white cartridge was used to staple across the mesoappendix  The appendix was placed into an Endo-Catch bag and removed from the abdomen and sent off as a specimen  All reactive fluid and scant amount of blood was aspirated from the abdomen  Using an Endo Close suture Passer the 12 mm trocar fascia was closed with an 0 Vicryl suture  The skin was closed with 4 Monocryl  Skin adhesive was applied         Dr Norma Mathews  was present for the entire procedure    Patient Disposition:  PACU     SIGNATURE: Larry Blackburn MD  DATE: August 17, 2018  TIME: 3:00 PM

## 2018-08-17 NOTE — ED PROVIDER NOTES
History  Chief Complaint   Patient presents with    Abdominal Pain     Pt with hx of constipation c/o lower abdominal pain since yesterday - began as "gas pain in my chest then moved to my abdomen" - took Gas-X w/o relief and has been awake since 0200 with pain in suprapubic area - last Bm 1-2 days ago     Patient: Laura Mata  85 y o /female  YOB: 1965  MRN: [id number]  PCP: Dana Odonnell MD  Date of evaluation: 8/17/2018    (N B   [address] may have been used in the preparation of this document  Occasional wrong word or "sound-alike" substitutions may have occurred due to the inherent limitations of voice recognition software  Interpretation should be guided by context )    Her abdominal pain began yesterday at about 11:00  It felt like gas in her chest   She felt somewhat better when she burped  The pain then went to her stomach/intestines  She took Gas-X and she did have flatus  At about 2130 she went to sleep  At 0 200 she awoke with the pain  After Gas-X, she had slight relief about 0415 but via 0445 the pain was severe again  History provided by:  Patient  Abdominal Pain   Chronicity:  New  Context: not alcohol use, not recent illness, not sick contacts and not suspicious food intake  Laxative use: partial relief  Relieved by:  Belching and flatus  Worsened by: Movement  Associated symptoms: belching and nausea    Associated symptoms: no chest pain, no chills, no cough, no diarrhea, no dysuria, no fever, no hematuria, no shortness of breath, no vaginal bleeding, no vaginal discharge and no vomiting  Constipation: she doesn't know      Differential diagnosis includes but is not limited to the following:   Appendicitis, diverticulitis, cholecystitis, biliary colic, cholangitis, hepatitis, pancreatitis, perforated viscus,ischemic bowel,  bowel obstruction, peptic ulcer disease, GERD, gastritis/gastropathy, gastroparesis, constipation,  fecal impaction, gastroenteritis, IBS,  cystitis, pyelonephritis, renal colic  Prior to Admission Medications   Prescriptions Last Dose Informant Patient Reported? Taking?   levothyroxine 100 mcg tablet   Yes Yes   Sig: Take 100 mcg by mouth daily      Facility-Administered Medications: None       Past Medical History:   Diagnosis Date    Asthma     Hypothyroid     IBS (irritable bowel syndrome)        Past Surgical History:   Procedure Laterality Date    HYSTERECTOMY      TONSILLECTOMY         History reviewed  No pertinent family history  I have reviewed and agree with the history as documented  Social History   Substance Use Topics    Smoking status: Current Every Day Smoker     Packs/day: 1 00     Years: 42 00     Types: Cigarettes    Smokeless tobacco: Never Used    Alcohol use No        Review of Systems   Constitutional: Negative for chills and fever  HENT: Negative for hearing loss, trouble swallowing and voice change  Eyes: Negative for pain, redness and visual disturbance  Respiratory: Negative for cough and shortness of breath  Cardiovascular: Negative for chest pain and palpitations  Gastrointestinal: Positive for abdominal pain and nausea  Negative for diarrhea and vomiting  Constipation: she doesn't know  Genitourinary: Negative for dysuria, hematuria, vaginal bleeding and vaginal discharge  Musculoskeletal: Negative for back pain, gait problem and neck pain  Skin: Negative for color change and rash  Neurological: Negative for weakness and light-headedness  Psychiatric/Behavioral: Negative for confusion and decreased concentration  The patient is nervous/anxious  All other systems reviewed and are negative  Physical Exam  Physical Exam   Constitutional: She is oriented to person, place, and time  She appears well-developed and well-nourished     HENT:   Mouth/Throat: Oropharynx is clear and moist and mucous membranes are normal    Voice normal   Eyes: EOM are normal  Pupils are equal, round, and reactive to light  Cardiovascular: Normal rate and regular rhythm  Pulmonary/Chest: Effort normal    Abdominal: Soft  Normal appearance  Bowel sounds are decreased  There is tenderness (severe) in the right lower quadrant and suprapubic area  There is no rigidity, no rebound, no guarding, no CVA tenderness and negative Guzman's sign  Suprapubic greater than RLQ   Neurological: She is alert and oriented to person, place, and time  GCS eye subscore is 4  GCS verbal subscore is 5  GCS motor subscore is 6  Skin: Skin is warm and dry  Psychiatric: Her speech is normal and behavior is normal  Her mood appears anxious (in the extreme)  She is attentive  Nursing note and vitals reviewed        Vital Signs  ED Triage Vitals [08/17/18 0649]   Temperature Pulse Respirations Blood Pressure SpO2   (!) 97 3 °F (36 3 °C) 87 20 120/62 99 %      Temp Source Heart Rate Source Patient Position - Orthostatic VS BP Location FiO2 (%)   Temporal Monitor Lying Left arm --      Pain Score       7           Vitals:    08/17/18 0930 08/17/18 0950 08/17/18 1030 08/17/18 1100   BP: 105/67 121/73 115/69 109/65   Pulse: 105 98 88 78   Patient Position - Orthostatic VS: Lying Lying Lying Lying       Visual Acuity      ED Medications  Medications   ketorolac (TORADOL) injection 15 mg (15 mg Intravenous Given 8/17/18 0739)   acetaminophen (TYLENOL) oral suspension 650 mg (650 mg Oral Given 8/17/18 0805)   iohexol (OMNIPAQUE) 350 MG/ML injection (MULTI-DOSE) 100 mL (100 mL Intravenous Given 8/17/18 0843)   sodium chloride 0 9 % bolus 1,000 mL (0 mL Intravenous Stopped 8/17/18 1105)       Diagnostic Studies  Results Reviewed     Procedure Component Value Units Date/Time    UA w Reflex to Microscopic w Reflex to Culture [[id number]] Collected:  08/17/18 0813    Lab Status:  Final result Specimen:  Urine from Urine, Clean Catch Updated:  08/17/18 0824     Color, UA Yellow     Clarity, UA Slightly Cloudy Specific Gravity, UA 1 025     pH, UA 6 5     Leukocytes, UA Negative     Nitrite, UA Negative     Protein, UA Negative mg/dl      Glucose, UA Negative mg/dl      Ketones, UA Negative mg/dl      Urobilinogen, UA 0 2 E U /dl      Bilirubin, UA Negative     Blood, UA Negative    Troponin I [[id number]]  (Normal) Collected:  08/17/18 0657    Lab Status:  Final result Specimen:  Blood Updated:  08/17/18 0752     Troponin I <0 02 ng/mL     Comprehensive metabolic panel [[id number]] Collected:  08/17/18 0657    Lab Status:  Final result Specimen:  Blood from Arm, Right Updated:  08/17/18 4958     Sodium 138 mmol/L      Potassium 3 8 mmol/L      Chloride 104 mmol/L      CO2 26 mmol/L      Anion Gap 8 mmol/L      BUN 16 mg/dL      Creatinine 0 91 mg/dL      Glucose 110 mg/dL      Calcium 9 3 mg/dL      AST 12 U/L      ALT 20 U/L      Alkaline Phosphatase 90 U/L      Total Protein 7 2 g/dL      Albumin 3 9 g/dL      Total Bilirubin 0 30 mg/dL      eGFR 72 ml/min/1 73sq m     Narrative:         National Kidney Disease Education Program recommendations are as follows:  GFR calculation is accurate only with a steady state creatinine  Chronic Kidney disease less than 60 ml/min/1 73 sq  meters  Kidney failure less than 15 ml/min/1 73 sq  meters      Lipase [[id number]]  (Normal) Collected:  08/17/18 0657    Lab Status:  Final result Specimen:  Blood from Arm, Right Updated:  08/17/18 0727     Lipase 170 u/L     CBC and differential [[id number]]  (Abnormal) Collected:  08/17/18 0657    Lab Status:  Final result Specimen:  Blood from Arm, Right Updated:  08/17/18 0716     WBC 16 96 (H) Thousand/uL      RBC 4 56 Million/uL      Hemoglobin 14 0 g/dL      Hematocrit 42 6 %      MCV 93 fL      MCH 30 7 pg      MCHC 32 9 g/dL      RDW 13 7 %      MPV 9 7 fL      Platelets 954 Thousands/uL      nRBC 0 /100 WBCs      Neutrophils Relative 81 (H) %      Immat GRANS % 0 %      Lymphocytes Relative 13 (L) %      Monocytes Relative 6 %      Eosinophils Relative 0 %      Basophils Relative 0 %      Neutrophils Absolute 13 52 (H) Thousands/µL      Immature Grans Absolute 0 06 Thousand/uL      Lymphocytes Absolute 2 24 Thousands/µL      Monocytes Absolute 1 07 Thousand/µL      Eosinophils Absolute 0 03 Thousand/µL      Basophils Absolute 0 04 Thousands/µL                  CT abdomen pelvis with contrast   Final Result by Annabella Thayer MD (08/17 7042)      Acute appendicitis with a 4 mm appendicolith  No evidence of perforation or abscess      I personally discussed this study with Luis E Juju on 8/17/2018 at 9:11 AM                       Workstation performed: [id number]                    Procedures  ECG 12 Lead Documentation  Date/Time: 8/17/2018 7:05 AM  Performed by: Ashanti Chaudhary by: Ruthie Lee     Indications / Diagnosis:  Chest and abd pain  ECG reviewed by me, the ED Provider: yes    Patient location:  ED  Previous ECG:     Comparison to cardiac monitor: Yes    Interpretation:     Interpretation: normal    Rate:     ECG rate:  79    ECG rate assessment: normal    Rhythm:     Rhythm: sinus rhythm    Ectopy:     Ectopy: none    QRS:     QRS axis:  Normal    QRS intervals:  Normal  Conduction:     Conduction: normal    ST segments:     ST segments:  Normal  T waves:     T waves: normal               Phone Contacts  ED Phone Contact    ED Course  ED Course as of Aug 19 2306   Fri Aug 17, 2018   0734 Pt c/o severe pain, nausea, but will not accept anything except the Toradol  She refuses Tylenol because "it tastes bad"  She will not accept nausea medication  She states that she is very anxious and that she has an anxiety disorder  She refuses any anxiolytic       0737 WBC: (!) 16 96   0928 For the third time pt demands to smoke a cigarette  I repeated that this is not an option  I again offered her a nicotine patch  She did not want it  She said that it gave her  chest pains when he used it    He reassured her that it was only because he slept with it on  I asked her if we could give her one  She did not answer  0930 Pt and  understand the need for transfer for surgical admission  They prefer B  I have placed a transfer order because we do not have surgery in house today  4660 D/W Dr Kitty Monique for General Surgery at [facility]  He accepts the patient  He says we can hold off on antibiotics and he will take care of that when she gets down there  0944 Pt to go straight to ED as a private exam for Surgery, when the decision for admission or direct OR will be made            HEART Risk Score      Most Recent Value   History  0 Filed at: 08/17/2018 0824   ECG  0 Filed at: 08/17/2018 2994   Age  1 Filed at: 08/17/2018 0824   Risk Factors  0 Filed at: 08/17/2018 0824   Troponin  0 Filed at: 08/17/2018 0824   Heart Score Risk Calculator   History  0 Filed at: 08/17/2018 0824   ECG  0 Filed at: 08/17/2018 3806   Age  1 Filed at: 08/17/2018 0824   Risk Factors  0 Filed at: 08/17/2018 0824   Troponin  0 Filed at: 08/17/2018 0824   HEART Score  1 Filed at: 08/17/2018 8605   HEART Score  1 Filed at: 08/17/2018 6793                            MDM  CritCare Time    Disposition  Final diagnoses:   Acute appendicitis     Time reflects when diagnosis was documented in both MDM as applicable and the Disposition within this note     Time User Action Codes Description Comment    8/17/2018  9:42 AM Myriam WHITE Add [K35 80] Acute appendicitis       ED Disposition     ED Disposition Condition Comment    Transfer to Another [facility] [place] [address] should be transferred out to [facility] - Dr Kitty Monique, general surgery      MD Documentation      Most Recent Value   Reason for Transfer  Level of Care needed not available at this facility   Benefits of Transfer  -- [Surgery]   Risks of Transfer  Potential for delay in receiving treatment, Potential deterioration of medical condition, Loss of IV, Increased discomfort during transfer   Accepting Physician [address], [address] [facility] ED    Sending MD Deb Mace   Provider Certification  General risk, such as traffic hazards, adverse weather conditions, rough terrain or turbulence, possible failure of equipment (including vehicle or aircraft), or consequences of actions of persons outside the control of the transport personnel, Unanticipated needs of medical equipment and personnel during transport, Risk of worsening condition      RN Documentation      [address] Name, [address] [facility] ED    Bed Assignment  [facility] ED   Report Given to  Humberto Sweet RN   Medications Reviewed with Next Provider of Service  No   Transport Mode  Ambulance   Level of Care  Advanced life support   Copies of Medical Records Sent  History and Physical, Orders, Progress note, Transfer form, Nursing note, Radiology, Labs, EKG, Med Rec form   Patient Belongings Disposition  Sent with patient   Transfer Date  08/17/18      Follow-up Information    None         Discharge Medication List as of 8/17/2018 11:16 AM      CONTINUE these medications which have NOT CHANGED    Details   levothyroxine 100 mcg tablet Take 100 mcg by mouth daily, Historical Med           No discharge procedures on file      ED Provider  Electronically Signed by           Lizy Hills MD  08/17/18 5839 Sneha Rahman MD  08/19/18 6558       Lizy Hills MD  08/19/18 3883

## 2018-08-17 NOTE — ED NOTES
Bedside report given to [facility]  Pt ambulated to bathroom prior to departure       Leticia Nesbitt RN  08/17/18 4267

## 2018-08-17 NOTE — ED NOTES
Pt resting comfortable  Call bell within reach  Family at bedside   Awaiting results of CT scan     Alex Garcia RN  08/17/18 1714

## 2018-08-17 NOTE — ANESTHESIA POSTPROCEDURE EVALUATION
Post-Op Assessment Note          Comments: pt c/o chest tightness  pain is worse with coughing  stat ekg obtained  cardiology stat paged   coming to see patient          BP      Temp      Pulse     Resp      SpO2

## 2018-08-17 NOTE — EMTALA/ACUTE CARE TRANSFER
[address]  [address] [facility] [address]  Dept: [phone]      EMTALA TRANSFER CONSENT    NAME Hunter Gallego                                         1965                              MRN [id number]    I have been informed of my rights regarding examination, treatment, and transfer   by Dr Amisha Solano MD    Benefits:  (Surgery)    Risks: Potential for delay in receiving treatment, Potential deterioration of medical condition, Loss of IV, Increased discomfort during transfer      Consent for Transfer:  I acknowledge that my medical condition has been evaluated and explained to me by the emergency department physician or other qualified medical person and/or my attending physician, who has recommended that I be transferred to the service of  Accepting Physician: Lane Silva at [address] Name, [address] : B ED   The above potential benefits of such transfer, the potential risks associated with such transfer, and the probable risks of not being transferred have been explained to me, and I fully understand them  The doctor has explained that, in my case, the benefits of transfer outweigh the risks  I agree to be transferred  I authorize the performance of emergency medical procedures and treatments upon me in both transit and upon arrival at the receiving facility  Additionally, I authorize the release of any and all medical records to the receiving facility and request they be transported with me, if possible  I understand that the safest mode of transportation during a medical emergency is an ambulance and that the Hospital advocates the use of this mode of transport  Risks of traveling to the receiving facility by car, including absence of medical control, life sustaining equipment, such as oxygen, and medical personnel has been explained to me and I fully understand them      (JOSE CORRECT BOX BELOW)  [  ]  I consent to the stated transfer and to be transported by ambulance/helicopter  [  ]  I consent to the stated transfer, but refuse transportation by ambulance and accept full responsibility for my transportation by car  I understand the risks of non-ambulance transfers and I exonerate the Hospital and its staff from any deterioration in my condition that results from this refusal     X___________________________________________    DATE  18  TIME________  Signature of patient or legally responsible individual signing on patient behalf           RELATIONSHIP TO PATIENT_________________________          Provider Ashutosh OLIVEIRA 1965                              MRN [id number]    A medical screening exam was performed on the above named patient  Based on the examination:    Condition Necessitating Transfer The encounter diagnosis was Acute appendicitis  Patient Condition:      Reason for Transfer: Level of Care needed not available at this facility    Transfer Requirements: Facility [facility] ED    · Space available and qualified personnel available for treatment as acknowledged by    · Agreed to accept transfer and to provide appropriate medical treatment as acknowledged by       Dr Angulo  · Appropriate medical records of the examination and treatment of the patient are provided at the time of transfer   [address], [address] _______  · Transfer will be performed by qualified personnel from    and appropriate transfer equipment as required, including the use of necessary and appropriate life support measures      Provider Certification: I have examined the patient and explained the following risks and benefits of being transferred/refusing transfer to the patient/family:  General risk, such as traffic hazards, adverse weather conditions, rough terrain or turbulence, possible failure of equipment (including vehicle or aircraft), or consequences of actions of persons outside the control of the transport personnel, Unanticipated needs of medical equipment and personnel during transport, Risk of worsening condition      Based on these reasonable risks and benefits to the patient and/or the unborn child(jazmin), and based upon the information available at the time of the patients examination, I certify that the medical benefits reasonably to be expected from the provision of appropriate medical treatments at another medical facility outweigh the increasing risks, if any, to the individuals medical condition, and in the case of labor to the unborn child, from effecting the transfer      X____________________________________________ DATE 08/17/18        TIME_______      ORIGINAL - SEND TO MEDICAL RECORDS   COPY - SEND WITH PATIENT DURING TRANSFER

## 2018-08-17 NOTE — PERIOPERATIVE NURSING NOTE
Dr Suni Trevino notified that pt  verbalized that she has misternal chest pain  She described chest pain as a pressure and is a pain level of 4 (pain scale 0-10)  Orders received

## 2018-08-17 NOTE — ANESTHESIA POSTPROCEDURE EVALUATION
Post-Op Assessment Note      CV Status:  Stable    Mental Status:  Lethargic    Hydration Status:  Stable and euvolemic    PONV Controlled:  None    Airway Patency:  Patent  Airway: intubated    Post Op Vitals Reviewed: Yes          Staff: CRNA       Comments: vss lungs clear          BP   113/58   Temp 97 5   Pulse 80   Resp 16   SpO2 100

## 2018-08-17 NOTE — ANESTHESIA PREPROCEDURE EVALUATION
Review of Systems/Medical History  Patient summary reviewed        Cardiovascular  EKG reviewed, Negative cardio ROS    Pulmonary  Smoker cigarette smoker  , Tobacco cessation counseling given , Asthma ,        GI/Hepatic  Negative GI/hepatic ROS          Negative  ROS        Endo/Other  History of thyroid disease , hypothyroidism,      GYN  Negative gynecology ROS          Hematology  Negative hematology ROS      Musculoskeletal  Negative musculoskeletal ROS        Neurology  Negative neurology ROS      Psychology   Negative psychology ROS              Physical Exam    Airway    Mallampati score: I  TM Distance: >3 FB  Neck ROM: full     Dental       Cardiovascular  Comment: Negative ROS, Rhythm: regular, Rate: normal,     Pulmonary  Breath sounds clear to auscultation,     Other Findings        Anesthesia Plan  ASA Score- 2 Emergent    Anesthesia Type- general with ASA Monitors  Additional Monitors:   Airway Plan: ETT  Plan Factors-    Induction- intravenous  Postoperative Plan-     Informed Consent- Anesthetic plan and risks discussed with patient  I personally reviewed this patient with the CRNA  Discussed and agreed on the Anesthesia Plan with the CRNA  Aida Goldman

## 2018-08-17 NOTE — ANESTHESIA POSTPROCEDURE EVALUATION
Post-Op Assessment Note          Comments: cardiology compares ekgs  nothing obvious for ACS  some borderline t wave changes   will continue to monitor          BP      Temp      Pulse     Resp      SpO2

## 2018-08-18 VITALS
SYSTOLIC BLOOD PRESSURE: 111 MMHG | HEART RATE: 60 BPM | BODY MASS INDEX: 27.66 KG/M2 | HEIGHT: 65 IN | TEMPERATURE: 98.8 F | OXYGEN SATURATION: 96 % | WEIGHT: 166 LBS | DIASTOLIC BLOOD PRESSURE: 60 MMHG | RESPIRATION RATE: 16 BRPM

## 2018-08-18 PROCEDURE — 99024 POSTOP FOLLOW-UP VISIT: CPT | Performed by: SURGERY

## 2018-08-18 RX ADMIN — IBUPROFEN 600 MG: 100 SUSPENSION ORAL at 05:19

## 2018-08-18 RX ADMIN — IBUPROFEN 600 MG: 100 SUSPENSION ORAL at 11:16

## 2018-08-18 NOTE — PROGRESS NOTES
Progress Note - General Surgery   Nicolasa Gallego 47 y o  female MRN: [id number]  Unit/Bed#: [facility] 810-01 Encounter: [id number]    Assessment:  52F with acute appendicitis s/p laparoscopic appendectomy 8/17  Plan:  - regular diet  - prn pain control  - d/c IVF  - d/c home today      Subjective/Objective   Subjective: doing well, pain tolerable  Tolerated normal PO diet yesterday with no complaints  Had some soft BP overnight but baseline low as is, mentating appropriately    Objective:    Blood pressure 96/60, pulse 71, temperature 98 3 °F (36 8 °C), temperature source Oral, resp  rate 16, height 5' 5" (1 651 m), weight 75 3 kg (166 lb), SpO2 93 %  ,Body mass index is 27 62 kg/m²  I/O last 24 hours: In: 1374 [P O :420; I V :1000]  Out: 455 [Urine:450; Blood:5]    Invasive Devices     Peripheral Intravenous Line            Peripheral IV 08/17/18 Right Antecubital 1 day                Physical Exam: General appearance: alert and oriented, in no acute distress  Head: Normocephalic, without obvious abnormality, atraumatic  Eyes: conjunctivae/corneas clear  PERRL, EOM's intact  Fundi benign  Lungs: clear to auscultation bilaterally  Heart: regular rate and rhythm, S1, S2 normal, no murmur, click, rub or gallop  Abdomen: soft, appropriately tender, incisions cdi closed with histoacryl, ND  Extremities: extremities normal, warm and well-perfused; no cyanosis, clubbing, or edema  Skin: Skin color, texture, turgor normal  No rashes or lesions  Neurologic: Alert and oriented X 3, normal strength and tone  Normal symmetric reflexes   Normal coordination and gait    Lab, Imaging and other studies:  Lab Results   Component Value Date    WBC 16 96 (H) 08/17/2018    HGB 14 0 08/17/2018    HCT 42 6 08/17/2018    MCV 93 08/17/2018     08/17/2018      Lab Results   Component Value Date    GLUCOSE 110 08/17/2018    CALCIUM 9 3 08/17/2018     08/17/2018    K 3 8 08/17/2018    CO2 26 08/17/2018     08/17/2018 BUN 16 08/17/2018    CREATININE 0 91 08/17/2018       VTE Pharmacologic Prophylaxis: Sequential compression device (Venodyne)   VTE Mechanical Prophylaxis: sequential compression device

## 2018-08-18 NOTE — PLAN OF CARE
Problem: PAIN - ADULT  Goal: Verbalizes/displays adequate comfort level or baseline comfort level  Interventions:  - Encourage patient to monitor pain and request assistance  - Assess pain using appropriate pain scale  - Administer analgesics based on type and severity of pain and evaluate response  - Implement non-pharmacological measures as appropriate and evaluate response  - Consider cultural and social influences on pain and pain management  - Notify physician/advanced practitioner if interventions unsuccessful or patient reports new pain  Outcome: Progressing      Problem: INFECTION - ADULT  Goal: Absence or prevention of progression during hospitalization  INTERVENTIONS:  - Assess and monitor for signs and symptoms of infection  - Monitor lab/diagnostic results  - Monitor all insertion sites, i e  indwelling lines, tubes, and drains  - Monitor endotracheal (as able) and nasal secretions for changes in amount and color  - [place] appropriate cooling/warming therapies per order  - Administer medications as ordered  - Instruct and encourage patient and family to use good hand hygiene technique  - Identify and instruct in appropriate isolation precautions for identified infection/condition  Outcome: Progressing    Goal: Absence of fever/infection during neutropenic period  INTERVENTIONS:  - Monitor WBC  - Implement neutropenic guidelines  Outcome: Progressing      Problem: SAFETY ADULT  Goal: Patient will remain free of falls  INTERVENTIONS:  - Assess patient frequently for physical needs  -  Identify cognitive and physical deficits and behaviors that affect risk of falls    -  [place] fall precautions as indicated by assessment   - Educate patient/family on patient safety including physical limitations  - Instruct patient to call for assistance with activity based on assessment  - Modify environment to reduce risk of injury  - Consider OT/PT consult to assist with strengthening/mobility  Outcome: Progressing    Goal: Maintain or return to baseline ADL function  INTERVENTIONS:  -  Assess patient's ability to carry out ADLs; assess patient's baseline for ADL function and identify physical deficits which impact ability to perform ADLs (bathing, care of mouth/teeth, toileting, grooming, dressing, etc )  - Assess/evaluate cause of self-care deficits   - Assess range of motion  - Assess patient's mobility; develop plan if impaired  - Assess patient's need for assistive devices and provide as appropriate  - Encourage maximum independence but intervene and supervise when necessary  ¯ Involve family in performance of ADLs  ¯ Assess for home care needs following discharge   ¯ Request OT consult to assist with ADL evaluation and planning for discharge  ¯ Provide patient education as appropriate  Outcome: Progressing    Goal: Maintain or return mobility status to optimal level  INTERVENTIONS:  - Assess patient's baseline mobility status (ambulation, transfers, stairs, etc )    - Identify cognitive and physical deficits and behaviors that affect mobility  - Identify mobility aids required to assist with transfers and/or ambulation (gait belt, sit-to-stand, lift, walker, cane, etc )  - [place] fall precautions as indicated by assessment  - Record patient progress and toleration of activity level on Mobility SBAR; progress patient to next Phase/Stage  - Instruct patient to call for assistance with activity based on assessment  - Request Rehabilitation consult to assist with strengthening/weightbearing, etc   Outcome: Progressing      Problem: DISCHARGE PLANNING  Goal: Discharge to home or other facility with appropriate resources  INTERVENTIONS:  - Identify barriers to discharge w/patient and caregiver  - Arrange for needed discharge resources and transportation as appropriate  - Identify discharge learning needs (meds, wound care, etc )  - Arrange for interpretive services to assist at discharge as needed  - Refer to Case Management Department for coordinating discharge planning if the patient needs post-hospital services based on physician/advanced practitioner order or complex needs related to functional status, cognitive ability, or social support system  Outcome: Progressing      Problem: Knowledge Deficit  Goal: Patient/family/caregiver demonstrates understanding of disease process, treatment plan, medications, and discharge instructions  Complete learning assessment and assess knowledge base    Interventions:  - Provide teaching at level of understanding  - Provide teaching via preferred learning methods  Outcome: Progressing

## 2018-08-18 NOTE — DISCHARGE SUMMARY
Discharge Summary - General Surgery   Yulia Gallego 47 y o  female MRN: [id number]  Unit/Bed#: [facility] 810-01 Encounter: [id number]    Admission Date:   Admission Orders     Ordered        08/17/18 1526  Place in Observation  Once                Discharge Date:  08/18/2018    Admitting Diagnosis: Abdominal pain [R10 9]  Acute appendicitis with localized peritonitis [K35 3]    Discharge Diagnosis:  Acute appendicitis    Resolved Problems  Date Reviewed: 8/17/2018    None          Attending:  Minda Meier Physician(s):  None    Procedures Performed: No orders of the defined types were placed in this encounter  08/18/2018:  Laparoscopic appendectomy    Pathology:  Pending at the time of discharge    HPI:  Mari Lala is a 47 y o  female who presents with abdominal pain for the past 1 day  Patient states that she started experiencing heartburn yesterday afternoon  This heartburn progressed down into her abdomen around dinnertime or 6:00 p m  during this time  The patient began experiencing nausea with no vomiting  She began experiencing anorexia  She denies diarrhea  She has also been experiencing subjective fevers and chills      Patient's abdominal pain is described as sharp and radiates to her back  It is relieved by laying still  It is exacerbated by movement and bumps and direct pressure  Patient describes pain increase on car rides  Upon presentation to [facility] patient had a CT scan of her abdomen which did show evidence of acute appendicitis  Hospital Course:  He was admitted to the Neurosurgery service  She was taken the operating room for laparoscopic appendectomy which she tolerated well  She was transferred to the floor postoperatively and started on regular diet  By postop day 1, she was tolerating a regular diet, ambulating without difficulty, pain was controlled on oral analgesia, voiding spontaneously, remained afebrile    She was deemed ready for discharge home  She was instructed to follow up in the General surgery office in 2-4 weeks  Condition at Discharge: good     Discharge instructions/Information to patient and family:   See after visit summary for information provided to patient and family  Provisions for Follow-Up Care:  See after visit summary for information related to follow-up care and any pertinent home health orders  Disposition: See After Visit Summary for discharge disposition information  Planned Readmission: No    Discharge Statement   I spent 25 minutes discharging the patient  This time was spent on the day of discharge  I had direct contact with the patient on the day of discharge  Additional documentation is required if more than 30 minutes were spent on discharge  Discharge Medications:  See after visit summary for reconciled discharge medications provided to patient and family

## 2018-08-18 NOTE — CASE MANAGEMENT
Initial Clinical Review    Admission: Date/Time/Statement: OBSERVATION 8/17/18 @ 1527 - TRANSFER FROM  [facility]     Orders Placed This Encounter   Procedures    Place in Observation     Standing Status:   Standing     Number of Occurrences:   1     Order Specific Question:   Admitting Physician     Answer:   Brennan Alvarez [[id number]]     Order Specific Question:   Level of Care     Answer:   Med Surg [16]     ED: Date/Time/Mode of Arrival:   ED Arrival Information     Expected Arrival Acuity Means of Arrival Escorted By Service Admission Type    8/17/2018 11:19 8/17/2018 12:15 [facility] [employer] [facility]    Arrival Complaint    appy        Chief Complaint:   Chief Complaint   Patient presents with    Abdominal Pain     Pt presents with lower abdominal pain  [facility] transfer from [facility]  History of Illness:  Lizbet Fields is a 47 y o  female who presents with abdominal pain for the past 1 day  Patient states that she started experiencing heartburn yesterday afternoon  This heartburn progressed down into her abdomen around dinnertime or 6:00 p m  during this time  The patient began experiencing nausea with no vomiting  She began experiencing anorexia  She denies diarrhea  She has also been experiencing subjective fevers and chills  Patient's abdominal pain is described as sharp and radiates to her back  It is relieved by laying still  It is exacerbated by movement and bumps and direct pressure  Patient describes pain increase on car rides  Upon presentation to  [facility] patient had a CT scan of her abdomen which did show evidence of acute appendicitis      ED Vital Signs:   ED Triage Vitals   Temperature Pulse Respirations Blood Pressure SpO2   08/17/18 1307 08/17/18 1230 08/17/18 1230 08/17/18 1230 08/17/18 1230   99 °F (37 2 °C) 82 20 120/64 97 %      Temp Source Heart Rate Source Patient Position - Orthostatic VS BP Location FiO2 (%) 08/17/18 1307 08/17/18 2327 08/17/18 1700 08/17/18 1700 --   Oral Monitor Lying Left arm       Pain Score       08/17/18 1230       9        Wt Readings from Last 1 Encounters:   08/17/18 75 3 kg (166 lb)     Vital Signs (abnormal):     08/17/18 2352  --  66  --   86/58  --  --  --  --   08/17/18 2330  --  --  --   82/58  --  --  --  --   08/17/18 2327  98 3 °F (36 8 °C)  69  16   81/44  94 %  None (Room air)  --  Lying   08/17/18 1530  --  66  16   95/49  96 %  None (Room air)  --  --     Abnormal Labs:    WBC 16 96    Diagnostic Test Results:     8/17 EKG - Normal sinus rhythm  Possible Left atrial enlargement  Borderline ECG  When compared with ECG of 29-JAN-2018 14:34,  No significant change was found    ED Treatment:   Medication Administration from 08/17/2018 1119 to 08/17/2018 1323    Date/Time Order Dose Route Action   08/17/2018 1323 ceFAZolin (ANCEF) IVPB (premix) 2,000 mg   Intravenous MAR Hold   08/17/2018 1323 metroNIDAZOLE (FLAGYL) IVPB (premix) 500 mg   Intravenous MAR Hold   08/17/2018 1304 metroNIDAZOLE (FLAGYL) IVPB (premix) 500 mg 500 mg Intravenous New Bag   08/17/2018 1231 sodium chloride 0 9 % infusion 125 mL/hr Intravenous New Bag   08/17/2018 1323 HYDROmorphone (DILAUDID) injection 0 5 mg   Intravenous MAR Hold        Past Medical/Surgical History:    Active Ambulatory Problems     Diagnosis Date Noted    No Active Ambulatory Problems     Resolved Ambulatory Problems     Diagnosis Date Noted    No Resolved Ambulatory Problems     Past Medical History:   Diagnosis Date    Asthma     Hypothyroid     IBS (irritable bowel syndrome)      Admitting Diagnosis: Abdominal pain [R10 9]  Acute appendicitis with localized peritonitis [K35 3]    Age/Sex: 47 y o  female    Assessment/Plan:   68-year-old female with acute appendicitis for 1 day     - Start Ancef/Flagyl  - OR for Laparoscopic Appendectomy     Admission Orders:  Scheduled Meds:   Current Facility-Administered Medications:  acetaminophen 650 mg Oral Q4H PRN   enoxaparin 40 mg Subcutaneous Daily   HYDROmorphone 0 5 mg Intravenous Q3H PRN   ibuprofen 600 mg Oral Q6H PRN   levothyroxine 100 mcg Oral Early Morning   nicotine 21 mg Transdermal Daily   ondansetron 4 mg Intravenous Q6H PRN     Continuous Infusions:  IV 0 9% NSS infused at 125 ml/hr     PRN Meds:   acetaminophen    HYDROmorphone    Ibuprofen x2    ondansetron    SCDs  Up as kristian   Diet regular   _____________________  8/17 Operative Report   Surgeon(s) and Role:     * Jaswant Vizcaino MD - Assisting     * Robert Lopez MD - Primary     Preop Diagnosis:  Acute appendicitis with localized peritonitis [K35 3]     Post-Op Diagnosis Codes:     * Acute appendicitis with localized peritonitis [K35 3]     Procedure(s) (LRB):  APPENDECTOMY LAPAROSCOPIC (N/A)  _____________________  8/18 Surgery Progress Note  52F with acute appendicitis s/p laparoscopic appendectomy 8/17      Plan:  - regular diet  - prn pain control  - d/c IVF  - d/c home today    Thank you,  [facility]  Utilization Review Department  Phone: [phone]; Fax [phone]  ATTENTION: Please call with any questions or concerns to [phone]  and carefully follow the prompts so that you are directed to the right person  Send all requests for admission clinical reviews, approved or denied determinations and any other requests to fax [phone]   All voicemails are confidential

## 2018-08-18 NOTE — PROGRESS NOTES
BP 90/50 in left arm and 86/58 in right arm  Dianna Kimble from surgery made aware  Pt refusing to have a bolus per hypotenson protocol  Pt states her baseline is always 90/50  Per Samanta increase maintenance fluids to 100 mls per hour and do not initiate hypotension protocol at this time

## 2018-09-04 ENCOUNTER — OFFICE VISIT (OUTPATIENT)
Dept: SURGERY | Facility: HOSPITAL | Age: 53
End: 2018-09-04

## 2018-09-04 VITALS
HEART RATE: 99 BPM | WEIGHT: 156 LBS | DIASTOLIC BLOOD PRESSURE: 75 MMHG | BODY MASS INDEX: 25.99 KG/M2 | TEMPERATURE: 98.4 F | SYSTOLIC BLOOD PRESSURE: 109 MMHG | HEIGHT: 65 IN

## 2018-09-04 DIAGNOSIS — D17.1 LIPOMA OF BACK: Primary | ICD-10-CM

## 2018-09-04 DIAGNOSIS — K35.30 ACUTE APPENDICITIS WITH LOCALIZED PERITONITIS: ICD-10-CM

## 2018-09-04 PROCEDURE — 99024 POSTOP FOLLOW-UP VISIT: CPT | Performed by: SURGERY

## 2018-09-04 RX ORDER — SIMETHICONE 80 MG
80 TABLET,CHEWABLE ORAL EVERY 6 HOURS
COMMUNITY
End: 2018-11-14

## 2018-09-04 RX ORDER — LEVOTHYROXINE SODIUM 0.1 MG/1
100 TABLET ORAL
COMMUNITY
Start: 2018-02-27

## 2018-09-04 RX ORDER — TRIAMCINOLONE ACETONIDE 5 MG/G
CREAM TOPICAL
COMMUNITY
Start: 2018-08-20 | End: 2018-12-20

## 2018-09-04 RX ORDER — TRIAMCINOLONE ACETONIDE 5 MG/G
CREAM TOPICAL
COMMUNITY
Start: 2018-05-15 | End: 2018-11-14

## 2018-09-04 RX ORDER — AMOXICILLIN 400 MG/5ML
POWDER, FOR SUSPENSION ORAL
COMMUNITY
Start: 2018-01-29 | End: 2018-12-20

## 2018-09-04 NOTE — ASSESSMENT & PLAN NOTE
Lipoma of the left lower back with associated low back pain  Pain controlled with intermittent in states the patient wishes not to pursue any surgical intervention this time  Follow-up ramona Conklin

## 2018-09-04 NOTE — PROGRESS NOTES
Assessment/Plan:    Lipoma of back  Lipoma of the left lower back with associated low back pain  Pain controlled with intermittent in states the patient wishes not to pursue any surgical intervention this time  Follow-up p r n       Acute appendicitis with localized peritonitis  Status post laparoscopic appendectomy  Overall patient doing well  On exam incisions healing well  Pathology reviewed  Follow-up p r n  Mars Danielson Diagnoses and all orders for this visit:    Lipoma of back    Acute appendicitis with localized peritonitis    Other orders  -     amoxicillin (AMOXIL) 400 MG/5ML suspension; take 10 milliliters by mouth twice a day for 10 days  -     triamcinolone (KENALOG) 0 5 % cream; Apply topically  -     levothyroxine (SYNTHROID) 100 mcg tablet; Take 100 mcg by mouth  -     simethicone (MYLICON) 80 mg chewable tablet; Chew 80 mg every 6 (six) hours  -     triamcinolone (KENALOG) 0 5 % cream; Apply topically          Subjective:      Patient ID: Rambo Jackson is a 47 y o  female  28-year-old female status post laparoscopic appendectomy, presents today for follow-up  Overall doing well  Tolerating diet  No nausea vomiting  No fevers or chills  Minimal abdominal soreness  She also complains of some lower back pain which she has had intermittently  She states she has this mass on her back that she has had approximately 1 year  No redness or drainage from the mass  The following portions of the patient's history were reviewed and updated as appropriate:   She  has a past medical history of Asthma; Hypothyroid; and IBS (irritable bowel syndrome)  She   Patient Active Problem List    Diagnosis Date Noted    Lipoma of back 09/04/2018     She  has a past surgical history that includes Tonsillectomy; Hysterectomy; and pr lap,appendectomy (N/A, 8/17/2018)  Her family history is not on file  She  reports that she has been smoking Cigarettes  She has a 42 00 pack-year smoking history   She has never used smokeless tobacco  She reports that she does not drink alcohol or use drugs  Current Outpatient Prescriptions   Medication Sig Dispense Refill    levothyroxine (SYNTHROID) 100 mcg tablet Take 100 mcg by mouth      levothyroxine 100 mcg tablet Take 100 mcg by mouth daily      simethicone (MYLICON) 80 mg chewable tablet Chew 80 mg every 6 (six) hours      triamcinolone (KENALOG) 0 5 % cream Apply topically      triamcinolone (KENALOG) 0 5 % cream Apply topically      amoxicillin (AMOXIL) 400 MG/5ML suspension take 10 milliliters by mouth twice a day for 10 days       No current facility-administered medications for this visit  She has No Known Allergies       Review of Systems   Constitutional: Negative  Gastrointestinal: Positive for abdominal pain  Musculoskeletal: Positive for back pain  Skin: Negative for color change, pallor, rash and wound  Objective:      /75   Pulse 99   Temp 98 4 °F (36 9 °C)   Ht 5' 5" (1 651 m)   Wt 70 8 kg (156 lb)   BMI 25 96 kg/m²          Physical Exam   Constitutional: She appears well-developed and well-nourished  No distress  Abdominal: Soft  She exhibits no distension and no mass  There is no tenderness  There is no rebound and no guarding  Incisions x3, clean, dry, intact  No evidence of any erythema or active drainage  Skin: Skin is warm  No rash noted  She is not diaphoretic  No erythema  No pallor  Noted approximately 2 centimeter subcutaneous mass of the left lower back  It appears to be consistent on exam with lipoma   Vitals reviewed

## 2018-09-04 NOTE — ASSESSMENT & PLAN NOTE
Status post laparoscopic appendectomy  Overall patient doing well  On exam incisions healing well  Pathology reviewed  Follow-up ramona Roberts

## 2018-11-02 ENCOUNTER — TELEPHONE (OUTPATIENT)
Dept: SURGERY | Facility: HOSPITAL | Age: 53
End: 2018-11-02

## 2018-11-02 NOTE — TELEPHONE ENCOUNTER
Patient Called asking If she can go for ultrasound for lipoma she spoke to you about at her last appointment please place order

## 2018-11-06 DIAGNOSIS — IMO0002 LUMP: Primary | ICD-10-CM

## 2018-11-09 ENCOUNTER — HOSPITAL ENCOUNTER (OUTPATIENT)
Dept: ULTRASOUND IMAGING | Facility: HOSPITAL | Age: 53
Discharge: HOME | End: 2018-11-09
Attending: SURGERY
Payer: COMMERCIAL

## 2018-11-09 DIAGNOSIS — IMO0002 LUMP: ICD-10-CM

## 2018-11-09 PROCEDURE — 76705 ECHO EXAM OF ABDOMEN: CPT

## 2018-11-13 RX ORDER — LEVOTHYROXINE SODIUM 0.1 MG/1
100 TABLET ORAL
COMMUNITY
Start: 2018-09-04 | End: 2018-11-14

## 2018-11-13 RX ORDER — SIMETHICONE 80 MG
80 TABLET,CHEWABLE ORAL EVERY 6 HOURS PRN
COMMUNITY
End: 2018-12-20

## 2018-11-14 ENCOUNTER — OFFICE VISIT (OUTPATIENT)
Dept: SURGERY | Facility: HOSPITAL | Age: 53
End: 2018-11-14
Payer: COMMERCIAL

## 2018-11-14 VITALS
SYSTOLIC BLOOD PRESSURE: 110 MMHG | HEIGHT: 65 IN | TEMPERATURE: 98.1 F | DIASTOLIC BLOOD PRESSURE: 65 MMHG | BODY MASS INDEX: 25.49 KG/M2 | WEIGHT: 153 LBS | HEART RATE: 86 BPM

## 2018-11-14 DIAGNOSIS — D17.1 LIPOMA OF BACK: Primary | ICD-10-CM

## 2018-11-14 PROCEDURE — 99212 OFFICE O/P EST SF 10 MIN: CPT | Performed by: SURGERY

## 2018-11-14 NOTE — PROGRESS NOTES
Assessment/Plan:    Lipoma of back  Patient presents for follow-up to discuss lump in lower left back  She does continue to have intermittent lower bilateral back pain  Ultrasound unremarkable  On exam there is a palpable subcutaneous mass  Patient not willing to undergo MRI at this point  She would prefer to hold off any type of surgical exploration  Follow-up in January for recheck and see how she is coming along  There are no diagnoses linked to this encounter  Subjective:      Patient ID: Melissa Marr is a 46 y o  female  53-year-old female who presents today for further discussion of palpable left lower back subcutaneous mass  States she still has chronic intermittent back pain  Unclear if she that this is related to this mass or if it is from the fact that she frequently picks up her grandson  She does have this palpable lesion in the left lower back which is beneath the skin  She states she has had this for at least ER  Again she is unclear if this is the source or if it is the chronic lifting  Denies any redness or drainage  No fevers or chills  The following portions of the patient's history were reviewed and updated as appropriate:   She  has a past medical history of Asthma; Hypothyroid; and IBS (irritable bowel syndrome)  She   Patient Active Problem List    Diagnosis Date Noted    Lipoma of back 09/04/2018     She  has a past surgical history that includes Tonsillectomy; Hysterectomy; and pr lap,appendectomy (N/A, 8/17/2018)  Her family history is not on file  She  reports that she has been smoking Cigarettes  She has a 42 00 pack-year smoking history  She has never used smokeless tobacco  She reports that she does not drink alcohol or use drugs    Current Outpatient Prescriptions   Medication Sig Dispense Refill    amoxicillin (AMOXIL) 400 MG/5ML suspension take 10 milliliters by mouth twice a day for 10 days      ibuprofen (MOTRIN) 100 mg/5 mL suspension TAKE 30 ML BY MOUTH EVERY 6 HOURS AS NEEDED FOR MILD OR MODERATE PAIN  0    levothyroxine (SYNTHROID) 100 mcg tablet Take 100 mcg by mouth      levothyroxine 100 mcg tablet Take 100 mcg by mouth daily      simethicone (MYLICON) 80 mg chewable tablet Chew 80 mg every 6 (six) hours as needed      triamcinolone (KENALOG) 0 5 % cream Apply topically       No current facility-administered medications for this visit  She has No Known Allergies       Review of Systems   Constitutional: Negative for activity change, appetite change, chills, diaphoresis, fatigue, fever and unexpected weight change  Musculoskeletal: Positive for back pain  Negative for gait problem  Skin: Negative for color change, pallor, rash and wound  Objective:      /65   Pulse 86   Temp 98 1 °F (36 7 °C)   Ht 5' 5" (1 651 m)   Wt 69 4 kg (153 lb)   BMI 25 46 kg/m²          Physical Exam   Constitutional: She is oriented to person, place, and time  She appears well-developed and well-nourished  No distress  HENT:   Head: Normocephalic and atraumatic  Eyes: No scleral icterus  Neck: Normal range of motion  No tracheal deviation present  Pulmonary/Chest: Effort normal    Neurological: She is alert and oriented to person, place, and time  No cranial nerve deficit  Skin: Skin is warm  No rash noted  She is not diaphoretic  No erythema  No pallor  There is a palpable slightly mobile subcutaneous mass in the left lower back just lateral to the spine on the left side  It measures approximately 2 cm in diameter  Mildly tender  No erythema or active drainage noted  Psychiatric: She has a normal mood and affect  Her behavior is normal    Vitals reviewed

## 2018-11-14 NOTE — ASSESSMENT & PLAN NOTE
Patient presents for follow-up to discuss lump in lower left back  She does continue to have intermittent lower bilateral back pain  Ultrasound unremarkable  On exam there is a palpable subcutaneous mass  Patient not willing to undergo MRI at this point  She would prefer to hold off any type of surgical exploration  Follow-up in January for recheck and see how she is coming along

## 2018-12-20 ENCOUNTER — OFFICE VISIT (OUTPATIENT)
Dept: URGENT CARE | Facility: CLINIC | Age: 53
End: 2018-12-20
Payer: COMMERCIAL

## 2018-12-20 ENCOUNTER — APPOINTMENT (OUTPATIENT)
Dept: RADIOLOGY | Facility: CLINIC | Age: 53
End: 2018-12-20
Payer: COMMERCIAL

## 2018-12-20 VITALS
RESPIRATION RATE: 18 BRPM | TEMPERATURE: 97.9 F | HEART RATE: 90 BPM | SYSTOLIC BLOOD PRESSURE: 110 MMHG | OXYGEN SATURATION: 96 % | WEIGHT: 153 LBS | DIASTOLIC BLOOD PRESSURE: 68 MMHG | BODY MASS INDEX: 24.01 KG/M2 | HEIGHT: 67 IN

## 2018-12-20 DIAGNOSIS — M25.561 ACUTE PAIN OF RIGHT KNEE: ICD-10-CM

## 2018-12-20 DIAGNOSIS — M25.561 ACUTE PAIN OF RIGHT KNEE: Primary | ICD-10-CM

## 2018-12-20 PROCEDURE — 73562 X-RAY EXAM OF KNEE 3: CPT

## 2018-12-20 PROCEDURE — G0382 LEV 3 HOSP TYPE B ED VISIT: HCPCS | Performed by: PHYSICIAN ASSISTANT

## 2018-12-20 RX ORDER — IBUPROFEN 800 MG/1
800 TABLET ORAL EVERY 8 HOURS PRN
Qty: 30 TABLET | Refills: 0 | Status: SHIPPED | OUTPATIENT
Start: 2018-12-20

## 2018-12-20 RX ORDER — ALBUTEROL SULFATE 90 UG/1
1 AEROSOL, METERED RESPIRATORY (INHALATION) EVERY 6 HOURS PRN
COMMUNITY
Start: 2018-11-21 | End: 2018-12-21

## 2018-12-20 NOTE — PROGRESS NOTES
[facility]        NAME: Malinda Campo is a 46 y o  female  : 1965    MRN: [id number]  DATE: 2018  TIME: 12:42 PM    Assessment and Plan   Acute pain of right knee [M25 561]  1  Acute pain of right knee  XR knee 3 vw right non injury    ibuprofen (MOTRIN) 800 mg tablet    Ambulatory referral to Orthopedic Surgery         Patient Instructions     Patient Instructions     Ice the knee  She can start ibuprofen  She would benefit follow up with Orthopedics  Concern for meniscal tear  Activity as tolerated  Follow up with PCP in 3-5 days  Proceed to  ER if symptoms worsen  Chief Complaint     Chief Complaint   Patient presents with    Knee Pain     C/O pain in right knee with no known injury x 5 days  Pt states that the knee feels as if it is going to give out  The only thing the patient remembers is that she was chasing her grandson but does not remember an injury  History of Present Illness         42-year-old female complains of right knee pain for 5 days  She denies fall or injury but she did after running after her grandchild at a store  The pain started several hours after that  No fall or trauma  She has painful walking weight-bearing  She has pain with squatting          Review of Systems   Review of Systems      Current Medications       Current Outpatient Prescriptions:     albuterol (PROAIR HFA) 90 mcg/act inhaler, Inhale 1 puff every 6 (six) hours as needed, Disp: , Rfl:     ibuprofen (MOTRIN) 800 mg tablet, Take 1 tablet (800 mg total) by mouth every 8 (eight) hours as needed for mild pain, Disp: 30 tablet, Rfl: 0    levothyroxine (SYNTHROID) 100 mcg tablet, Take 100 mcg by mouth, Disp: , Rfl:     Current Allergies     Allergies as of 2018    (No Known Allergies)            The following portions of the patient's history were reviewed and updated as appropriate: allergies, current medications, past family history, past medical history, past social history, past surgical history and problem list      Past Medical History:   Diagnosis Date    Asthma     Hypothyroid     IBS (irritable bowel syndrome)        Past Surgical History:   Procedure Laterality Date    HYSTERECTOMY      WA LAP,APPENDECTOMY N/A 8/17/2018    Procedure: APPENDECTOMY LAPAROSCOPIC;  Surgeon: Piedad Dacosta MD;  Location: [facility];  Service: General    TONSILLECTOMY         No family history on file  Medications have been verified  Objective   /68   Pulse 90   Temp 97 9 °F (36 6 °C) (Tympanic)   Resp 18   Ht 5' 7" (1 702 m)   Wt 69 4 kg (153 lb)   SpO2 96%   BMI 23 96 kg/m²        Physical Exam     Physical Exam   Constitutional: She appears well-developed and well-nourished  Musculoskeletal:     Right knee no effusion  She is tender along the medial joint line  Painful valgus and with Tesha's but no pop per laxity  Negative drawers  No pain with varus  Full active range of motion  Pain with flexion past 90

## 2018-12-20 NOTE — PATIENT INSTRUCTIONS
Ice the knee  She can start ibuprofen  She would benefit follow up with Orthopedics  Concern for meniscal tear  Activity as tolerated

## 2019-01-24 ENCOUNTER — HOSPITAL ENCOUNTER (EMERGENCY)
Facility: HOSPITAL | Age: 54
Discharge: HOME | End: 2019-01-24
Attending: EMERGENCY MEDICINE | Admitting: EMERGENCY MEDICINE
Payer: COMMERCIAL

## 2019-01-24 ENCOUNTER — APPOINTMENT (EMERGENCY)
Dept: CT IMAGING | Facility: HOSPITAL | Age: 54
End: 2019-01-24
Payer: COMMERCIAL

## 2019-01-24 VITALS
RESPIRATION RATE: 16 BRPM | SYSTOLIC BLOOD PRESSURE: 107 MMHG | BODY MASS INDEX: 25 KG/M2 | OXYGEN SATURATION: 98 % | WEIGHT: 159.61 LBS | HEART RATE: 75 BPM | DIASTOLIC BLOOD PRESSURE: 58 MMHG | TEMPERATURE: 98.1 F

## 2019-01-24 DIAGNOSIS — K52.9 COLITIS: ICD-10-CM

## 2019-01-24 DIAGNOSIS — K92.2 GI BLEED: Primary | ICD-10-CM

## 2019-01-24 LAB
ABO GROUP BLD: NORMAL
ALBUMIN SERPL BCP-MCNC: 3.3 G/DL (ref 3.5–5)
ALP SERPL-CCNC: 79 U/L (ref 46–116)
ALT SERPL W P-5'-P-CCNC: 15 U/L (ref 12–78)
ANION GAP SERPL CALCULATED.3IONS-SCNC: 8 MMOL/L (ref 4–13)
APTT PPP: 29 SECONDS (ref 26–38)
AST SERPL W P-5'-P-CCNC: 13 U/L (ref 5–45)
ATRIAL RATE: 88 BPM
BASOPHILS # BLD AUTO: 0.04 THOUSANDS/ΜL (ref 0–0.1)
BASOPHILS NFR BLD AUTO: 1 % (ref 0–1)
BILIRUB SERPL-MCNC: 0.2 MG/DL (ref 0.2–1)
BILIRUB UR QL STRIP: NEGATIVE
BLD GP AB SCN SERPL QL: NEGATIVE
BUN SERPL-MCNC: 15 MG/DL (ref 5–25)
CALCIUM SERPL-MCNC: 8.9 MG/DL (ref 8.3–10.1)
CHLORIDE SERPL-SCNC: 106 MMOL/L (ref 100–108)
CLARITY UR: CLEAR
CO2 SERPL-SCNC: 29 MMOL/L (ref 21–32)
COLOR UR: YELLOW
CREAT SERPL-MCNC: 0.9 MG/DL (ref 0.6–1.3)
EOSINOPHIL # BLD AUTO: 0.1 THOUSAND/ΜL (ref 0–0.61)
EOSINOPHIL NFR BLD AUTO: 1 % (ref 0–6)
ERYTHROCYTE [DISTWIDTH] IN BLOOD BY AUTOMATED COUNT: 13.4 % (ref 11.6–15.1)
GFR SERPL CREATININE-BSD FRML MDRD: 73 ML/MIN/1.73SQ M
GLUCOSE SERPL-MCNC: 113 MG/DL (ref 65–140)
GLUCOSE UR STRIP-MCNC: NEGATIVE MG/DL
HCT VFR BLD AUTO: 40.9 % (ref 34.8–46.1)
HGB BLD-MCNC: 13.2 G/DL (ref 11.5–15.4)
HGB UR QL STRIP.AUTO: NEGATIVE
IMM GRANULOCYTES # BLD AUTO: 0.02 THOUSAND/UL (ref 0–0.2)
IMM GRANULOCYTES NFR BLD AUTO: 0 % (ref 0–2)
INR PPP: 1.04 (ref 0.86–1.17)
KETONES UR STRIP-MCNC: NEGATIVE MG/DL
LACTATE SERPL-SCNC: 1.4 MMOL/L (ref 0.5–2)
LEUKOCYTE ESTERASE UR QL STRIP: NEGATIVE
LYMPHOCYTES # BLD AUTO: 3.22 THOUSANDS/ΜL (ref 0.6–4.47)
LYMPHOCYTES NFR BLD AUTO: 39 % (ref 14–44)
MAGNESIUM SERPL-MCNC: 2.1 MG/DL (ref 1.6–2.6)
MCH RBC QN AUTO: 30.9 PG (ref 26.8–34.3)
MCHC RBC AUTO-ENTMCNC: 32.3 G/DL (ref 31.4–37.4)
MCV RBC AUTO: 96 FL (ref 82–98)
MONOCYTES # BLD AUTO: 0.59 THOUSAND/ΜL (ref 0.17–1.22)
MONOCYTES NFR BLD AUTO: 7 % (ref 4–12)
NEUTROPHILS # BLD AUTO: 4.24 THOUSANDS/ΜL (ref 1.85–7.62)
NEUTS SEG NFR BLD AUTO: 52 % (ref 43–75)
NITRITE UR QL STRIP: NEGATIVE
NRBC BLD AUTO-RTO: 0 /100 WBCS
P AXIS: 66 DEGREES
PH UR STRIP.AUTO: 6 [PH] (ref 4.5–8)
PLATELET # BLD AUTO: 286 THOUSANDS/UL (ref 149–390)
PMV BLD AUTO: 9.8 FL (ref 8.9–12.7)
POTASSIUM SERPL-SCNC: 3.7 MMOL/L (ref 3.5–5.3)
PR INTERVAL: 150 MS
PROT SERPL-MCNC: 6.6 G/DL (ref 6.4–8.2)
PROT UR STRIP-MCNC: NEGATIVE MG/DL
PROTHROMBIN TIME: 13.1 SECONDS (ref 11.8–14.2)
QRS AXIS: 40 DEGREES
QRSD INTERVAL: 74 MS
QT INTERVAL: 380 MS
QTC INTERVAL: 459 MS
RBC # BLD AUTO: 4.27 MILLION/UL (ref 3.81–5.12)
RH BLD: POSITIVE
SODIUM SERPL-SCNC: 143 MMOL/L (ref 136–145)
SP GR UR STRIP.AUTO: <=1.005 (ref 1–1.03)
SPECIMEN EXPIRATION DATE: NORMAL
T WAVE AXIS: 44 DEGREES
TROPONIN I SERPL-MCNC: <0.02 NG/ML
TSH SERPL DL<=0.05 MIU/L-ACNC: 0.36 UIU/ML (ref 0.36–3.74)
UROBILINOGEN UR QL STRIP.AUTO: 0.2 E.U./DL
VENTRICULAR RATE: 88 BPM
WBC # BLD AUTO: 8.21 THOUSAND/UL (ref 4.31–10.16)

## 2019-01-24 PROCEDURE — 74177 CT ABD & PELVIS W/CONTRAST: CPT

## 2019-01-24 PROCEDURE — 81003 URINALYSIS AUTO W/O SCOPE: CPT | Performed by: EMERGENCY MEDICINE

## 2019-01-24 PROCEDURE — 96365 THER/PROPH/DIAG IV INF INIT: CPT

## 2019-01-24 PROCEDURE — 93010 ELECTROCARDIOGRAM REPORT: CPT | Performed by: INTERNAL MEDICINE

## 2019-01-24 PROCEDURE — 96366 THER/PROPH/DIAG IV INF ADDON: CPT

## 2019-01-24 PROCEDURE — C9113 INJ PANTOPRAZOLE SODIUM, VIA: HCPCS | Performed by: EMERGENCY MEDICINE

## 2019-01-24 PROCEDURE — 70450 CT HEAD/BRAIN W/O DYE: CPT

## 2019-01-24 PROCEDURE — 83605 ASSAY OF LACTIC ACID: CPT | Performed by: EMERGENCY MEDICINE

## 2019-01-24 PROCEDURE — 83735 ASSAY OF MAGNESIUM: CPT | Performed by: EMERGENCY MEDICINE

## 2019-01-24 PROCEDURE — 84484 ASSAY OF TROPONIN QUANT: CPT | Performed by: EMERGENCY MEDICINE

## 2019-01-24 PROCEDURE — 99285 EMERGENCY DEPT VISIT HI MDM: CPT

## 2019-01-24 PROCEDURE — 85610 PROTHROMBIN TIME: CPT | Performed by: EMERGENCY MEDICINE

## 2019-01-24 PROCEDURE — 85730 THROMBOPLASTIN TIME PARTIAL: CPT | Performed by: EMERGENCY MEDICINE

## 2019-01-24 PROCEDURE — 86850 RBC ANTIBODY SCREEN: CPT | Performed by: EMERGENCY MEDICINE

## 2019-01-24 PROCEDURE — 84443 ASSAY THYROID STIM HORMONE: CPT | Performed by: EMERGENCY MEDICINE

## 2019-01-24 PROCEDURE — 96361 HYDRATE IV INFUSION ADD-ON: CPT

## 2019-01-24 PROCEDURE — 86900 BLOOD TYPING SEROLOGIC ABO: CPT | Performed by: EMERGENCY MEDICINE

## 2019-01-24 PROCEDURE — 93005 ELECTROCARDIOGRAM TRACING: CPT

## 2019-01-24 PROCEDURE — 80053 COMPREHEN METABOLIC PANEL: CPT | Performed by: EMERGENCY MEDICINE

## 2019-01-24 PROCEDURE — 85025 COMPLETE CBC W/AUTO DIFF WBC: CPT | Performed by: EMERGENCY MEDICINE

## 2019-01-24 PROCEDURE — 86901 BLOOD TYPING SEROLOGIC RH(D): CPT | Performed by: EMERGENCY MEDICINE

## 2019-01-24 RX ORDER — METRONIDAZOLE 500 MG/1
500 TABLET ORAL EVERY 8 HOURS SCHEDULED
Qty: 21 TABLET | Refills: 0 | Status: SHIPPED | OUTPATIENT
Start: 2019-01-24 | End: 2019-01-31

## 2019-01-24 RX ORDER — CIPROFLOXACIN 500 MG/1
500 TABLET, FILM COATED ORAL 2 TIMES DAILY
Qty: 14 TABLET | Refills: 0 | Status: SHIPPED | OUTPATIENT
Start: 2019-01-24 | End: 2019-01-31

## 2019-01-24 RX ADMIN — SODIUM CHLORIDE 80 MG: 9 INJECTION, SOLUTION INTRAVENOUS at 15:42

## 2019-01-24 RX ADMIN — SODIUM CHLORIDE 1000 ML: 0.9 INJECTION, SOLUTION INTRAVENOUS at 14:53

## 2019-01-24 RX ADMIN — SODIUM CHLORIDE 8 MG/HR: 9 INJECTION, SOLUTION INTRAVENOUS at 16:45

## 2019-01-24 RX ADMIN — IOHEXOL 100 ML: 350 INJECTION, SOLUTION INTRAVENOUS at 16:40

## 2019-01-24 NOTE — DISCHARGE INSTRUCTIONS
Colitis   WHAT YOU NEED TO KNOW:   Colitis is swelling and irritation of your colon  Colitis may be caused by ulcers or a problem with your immune system  Bacteria, a virus, or a parasite may also cause colitis  The cause may not be known  You may have diarrhea, abdominal pain, fever, or blood or mucus in your bowel movement  DISCHARGE INSTRUCTIONS:   Return to the emergency department if:   · You have sudden trouble breathing  · Your bowel movements are black or have blood in them  · You have blood in your vomit  · You have severe abdominal pain or your abdomen is swollen and feels hard  · You have any of the following signs of dehydration:     ¨ Dizziness or weakness    ¨ Dry mouth, cracked lips, or severe thirst    ¨ Fast heartbeat or breathing    ¨ Urinating very little or not at all  Contact your healthcare provider if:   · Your symptoms get worse or do not go away  · You have a fever, chills, cough, or feel weak and achy  · You suddenly lose weight without trying  · You have questions or concerns about your condition or care  Medicines:   · Medicines  may be given to decrease inflammation in your colon and treat diarrhea  · Take your medicine as directed  Contact your healthcare provider if you think your medicine is not helping or if you have side effects  Tell him of her if you are allergic to any medicine  Keep a list of the medicines, vitamins, and herbs you take  Include the amounts, and when and why you take them  Bring the list or the pill bottles to follow-up visits  Carry your medicine list with you in case of an emergency  Manage your symptoms:   · Drink liquids as directed  to help prevent dehydration  Good liquids to drink include water, juice, and broth  Ask how much liquid to drink each day  You may need to drink an oral rehydration solution (ORS)  An ORS contains a balance of water, salt, and sugar to replace body fluids lost during diarrhea       · Eat a variety of healthy foods  Healthy foods include fruits, vegetables, whole-grain breads, beans, low-fat dairy products, lean meats, and fish  You may need to eat several small meals throughout the day instead of large meals  Avoid spicy foods, caffeine, chocolate, and foods high in fat  · Talk to your healthcare provider before you take NSAIDs  NSAIDs can cause worsen your symptoms if ulcers are causing your colitis  · Start to exercise when you feel better  Regular exercise helps your bowels work normally  Ask about the best exercise plan for you  Follow up with your healthcare provider as directed: You may need to return for a colonoscopy or other tests  Write down how often you have a bowel movements and what they look like  Bring this to your follow-up visits  Write down your questions so you remember to ask them during your visits  © 2017 [employer] Information is for End User's use only and may not be sold, redistributed or otherwise used for commercial purposes  All illustrations and images included in CareNotes® are the copyrighted property of A D [employer] M [employer] Inc  or Scotty Steinberg  The above information is an  only  It is not intended as medical advice for individual conditions or treatments  Talk to your doctor, nurse or pharmacist before following any medical regimen to see if it is safe and effective for you

## 2019-01-24 NOTE — ED PROCEDURE NOTE
PROCEDURE  ECG 12 Lead Documentation  Date/Time: 1/24/2019 2:49 PM  Performed by: Shanice Santana  Authorized by: Shanice Santana     Indications / Diagnosis:  Gi bleed  ECG reviewed by me, the ED Provider: yes    Patient location:  ED  Previous ECG:     Previous ECG:  Unavailable  Interpretation:     Interpretation: non-specific    Rate:     ECG rate:  88    ECG rate assessment: normal    Rhythm:     Rhythm: sinus rhythm    ST segments:     ST segments:  Non-specific         Regulo Simons DO  01/24/19 1449

## 2019-01-24 NOTE — ED PROVIDER NOTES
History  Chief Complaint   Patient presents with    Rectal Bleeding     pt states she has been having rectal bleeding for about 1 week  has been lethargic and tired, and cold  also c/o headache      62-year-old female presents complaining of maroon-colored stool per rectum which been present for 1 week  Patient also states of right lower quadrant abdominal pain  Patient does have a known history of IBS  She states she did not come sooner because she did not think she would need to be admitted for this  She states she has passed blood with bowel movements as well as without bowel movements  She states that she came the emergency department today because she started feel lightheaded she is concerned that she may be anemic        Black or Bloody Stool   Quality:  Maroon  Amount: Moderate  Duration: 5  Timing:  Intermittent  Context: constipation and defecation    Context: not anal fissures and not anal penetration    Similar prior episodes: no    Relieved by:  Nothing  Worsened by:  Defecation  Associated symptoms: abdominal pain and light-headedness    Associated symptoms: no dizziness and no epistaxis    Risk factors: no anticoagulant use and no hx of colorectal cancer        Prior to Admission Medications   Prescriptions Last Dose Informant Patient Reported?  Taking?   ibuprofen (MOTRIN) 800 mg tablet   No No   Sig: Take 1 tablet (800 mg total) by mouth every 8 (eight) hours as needed for mild pain   levothyroxine (SYNTHROID) 100 mcg tablet 1/24/2019 at Unknown time  Yes Yes   Sig: Take 100 mcg by mouth      Facility-Administered Medications: None       Past Medical History:   Diagnosis Date    Asthma     Hypothyroid     IBS (irritable bowel syndrome)        Past Surgical History:   Procedure Laterality Date    APPENDECTOMY      HYSTERECTOMY      [place] LAP,APPENDECTOMY N/A 8/17/2018    Procedure: APPENDECTOMY LAPAROSCOPIC;  Surgeon: Lia Peters MD;  Location: [facility];  Service: [facility] TONSILLECTOMY         History reviewed  No pertinent family history  I have reviewed and agree with the history as documented  Social History   Substance Use Topics    Smoking status: Current Every Day Smoker     Packs/day: 1 00     Years: 42 00     Types: Cigarettes    Smokeless tobacco: Never Used    Alcohol use No        Review of Systems   Constitutional: Negative for activity change and appetite change  HENT: Negative for congestion, dental problem, drooling and nosebleeds  Eyes: Negative for pain, discharge and itching  Respiratory: Negative for apnea, choking and chest tightness  Cardiovascular: Negative for chest pain  Gastrointestinal: Positive for abdominal pain and blood in stool  Endocrine: Negative for cold intolerance and heat intolerance  Genitourinary: Negative for difficulty urinating, dyspareunia and dysuria  Musculoskeletal: Negative for arthralgias, back pain and gait problem  Skin: Negative for color change and pallor  Allergic/Immunologic: Negative for environmental allergies and food allergies  Neurological: Positive for light-headedness  Negative for dizziness  Hematological: Negative for adenopathy  Psychiatric/Behavioral: Negative for agitation, behavioral problems, confusion and decreased concentration  All other systems reviewed and are negative  Physical Exam  Physical Exam   Constitutional: She appears well-developed and well-nourished  HENT:   Head: Normocephalic  Right Ear: External ear normal    Left Ear: External ear normal    Eyes: Pupils are equal, round, and reactive to light  Right eye exhibits no discharge  Left eye exhibits no discharge  Neck: Normal range of motion  No JVD present  No tracheal deviation present  No thyromegaly present  Cardiovascular: Normal rate, regular rhythm and normal heart sounds  Pulmonary/Chest: Effort normal  No respiratory distress  She has no wheezes  She has no rales     Abdominal: She exhibits no distension  There is tenderness  Tender palpation the right lower quadrant   Genitourinary:   Genitourinary Comments: Tarry black stool   Musculoskeletal: She exhibits no edema, tenderness or deformity  Neurological: She is alert  She displays normal reflexes  No cranial nerve deficit  Coordination normal    Skin: Skin is warm  Capillary refill takes less than 2 seconds  No erythema  Psychiatric: She has a normal mood and affect  Her behavior is normal    Vitals reviewed        Vital Signs  ED Triage Vitals [01/24/19 1418]   Temperature Pulse Respirations Blood Pressure SpO2   98 1 °F (36 7 °C) 84 18 115/70 95 %      Temp Source Heart Rate Source Patient Position - Orthostatic VS BP Location FiO2 (%)   Temporal Monitor Lying Right arm --      Pain Score       3           Vitals:    01/24/19 1418 01/24/19 1530 01/24/19 1614 01/24/19 1645   BP: 115/70  110/59 116/58   Pulse: 84 72 77 78   Patient Position - Orthostatic VS: Lying  Lying Lying       Visual Acuity      ED Medications  Medications   pantoprazole (PROTONIX) 80 mg in sodium chloride 0 9 % 100 mL infusion (8 mg/hr Intravenous New Bag 1/24/19 1645)   sodium chloride 0 9 % bolus 1,000 mL (0 mL Intravenous Stopped 1/24/19 1553)   pantoprazole (PROTONIX) 80 mg in sodium chloride 0 9 % 100 mL IVPB (0 mg Intravenous Stopped 1/24/19 1605)   iohexol (OMNIPAQUE) 350 MG/ML injection (SINGLE-DOSE) 100 mL (100 mL Intravenous Given 1/24/19 1640)       Diagnostic Studies  Results Reviewed     Procedure Component Value Units Date/Time    UA w Reflex to Microscopic w Reflex to Culture [[id number]] Collected:  01/24/19 1647    Lab Status:  Final result Specimen:  Urine from Urine, Clean Catch Updated:  01/24/19 1705     Color, UA Yellow     Clarity, UA Clear     Specific Gravity, UA <=1 005     pH, UA 6 0     Leukocytes, UA Negative     Nitrite, UA Negative     Protein, UA Negative mg/dl      Glucose, UA Negative mg/dl      Ketones, UA Negative mg/dl Urobilinogen, UA 0 2 E U /dl      Bilirubin, UA Negative     Blood, UA Negative    TSH [[id number]]  (Normal) Collected:  01/24/19 1452    Lab Status:  Final result Specimen:  Blood from Arm, Right Updated:  01/24/19 1533     TSH 3RD GENERATON 0 362 uIU/mL     Narrative:         Patients undergoing fluorescein dye angiography may retain small amounts of fluorescein in the body for 48-72 hours post procedure  Samples containing fluorescein can produce falsely depressed TSH values  If the patient had this procedure,a specimen should be resubmitted post fluorescein clearance  The recommended reference ranges for TSH during pregnancy are as follows:  First trimester 0 1 to 2 5 uIU/mL  Second trimester  0 2 to 3 0 uIU/mL  Third trimester 0 3 to 3 0 uIU/m      Magnesium [[id number]]  (Normal) Collected:  01/24/19 1452    Lab Status:  Final result Specimen:  Blood from Arm, Right Updated:  01/24/19 1533     Magnesium 2 1 mg/dL     Comprehensive metabolic panel [[id number]]  (Abnormal) Collected:  01/24/19 1452    Lab Status:  Final result Specimen:  Blood from Arm, Right Updated:  01/24/19 1532     Sodium 143 mmol/L      Potassium 3 7 mmol/L      Chloride 106 mmol/L      CO2 29 mmol/L      ANION GAP 8 mmol/L      BUN 15 mg/dL      Creatinine 0 90 mg/dL      Glucose 113 mg/dL      Calcium 8 9 mg/dL      AST 13 U/L      ALT 15 U/L      Alkaline Phosphatase 79 U/L      Total Protein 6 6 g/dL      Albumin 3 3 (L) g/dL      Total Bilirubin 0 20 mg/dL      eGFR 73 ml/min/1 73sq m     Narrative:         National Kidney Disease Education Program recommendations are as follows:  GFR calculation is accurate only with a steady state creatinine  Chronic Kidney disease less than 60 ml/min/1 73 sq  meters  Kidney failure less than 15 ml/min/1 73 sq  meters      Troponin I [[id number]]  (Normal) Collected:  01/24/19 1452    Lab Status:  Final result Specimen:  Blood from Arm, Right Updated:  01/24/19 1526     Troponin I <0 02 ng/mL Lactic acid, plasma [[id number]]  (Normal) Collected:  01/24/19 1452    Lab Status:  Final result Specimen:  Blood from Arm, Right Updated:  01/24/19 1525     LACTIC ACID 1 4 mmol/L     Narrative:         Result may be elevated if tourniquet was used during collection  Protime-INR [[id number]]  (Normal) Collected:  01/24/19 1452    Lab Status:  Final result Specimen:  Blood from Arm, Right Updated:  01/24/19 1515     Protime 13 1 seconds      INR 1 04    APTT [[id number]]  (Normal) Collected:  01/24/19 1452    Lab Status:  Final result Specimen:  Blood from Arm, Right Updated:  01/24/19 1515     PTT 29 seconds     CBC and differential [[id number]] Collected:  01/24/19 1452    Lab Status:  Final result Specimen:  Blood from Arm, Right Updated:  01/24/19 1507     WBC 8 21 Thousand/uL      RBC 4 27 Million/uL      Hemoglobin 13 2 g/dL      Hematocrit 40 9 %      MCV 96 fL      MCH 30 9 pg      MCHC 32 3 g/dL      RDW 13 4 %      MPV 9 8 fL      Platelets 103 Thousands/uL      nRBC 0 /100 WBCs      Neutrophils Relative 52 %      Immat GRANS % 0 %      Lymphocytes Relative 39 %      Monocytes Relative 7 %      Eosinophils Relative 1 %      Basophils Relative 1 %      Neutrophils Absolute 4 24 Thousands/µL      Immature Grans Absolute 0 02 Thousand/uL      Lymphocytes Absolute 3 22 Thousands/µL      Monocytes Absolute 0 59 Thousand/µL      Eosinophils Absolute 0 10 Thousand/µL      Basophils Absolute 0 04 Thousands/µL                  CT abdomen pelvis with contrast   Final Result by Maurice Richard MD (01/24 1707)   Descending and sigmoid colonic diffuse wall thickening and inflammatory change consistent with nonspecific infectious or inflammatory colitis, uncomplicated  Workstation performed: [id number]         CT head without contrast   Final Result by Maurice Richard MD (01/24 2458)      No acute intracranial abnormality  Chronic right maxillary sinus disease                    Workstation performed: [id number]                    Procedures  Procedures       Phone Contacts  ED Phone Contact    ED Course         HEART Risk Score      Most Recent Value   History  1 Filed at: 01/24/2019 1449   ECG  0 Filed at: 01/24/2019 1449   Age  1 Filed at: 01/24/2019 1449   Risk Factors  1 Filed at: 01/24/2019 1449   Troponin  0 Filed at: 01/24/2019 1449   Heart Score Risk Calculator   History  1 Filed at: 01/24/2019 1449   ECG  0 Filed at: 01/24/2019 1449   Age  1 Filed at: 01/24/2019 1449   Risk Factors  1 Filed at: 01/24/2019 1449   Troponin  0 Filed at: 01/24/2019 1449   HEART Score  3 Filed at: 01/24/2019 1449   HEART Score  3 Filed at: 01/24/2019 1449                            MDM  Number of Diagnoses or Management Options  Colitis:   GI bleed:   Diagnosis management comments: GI bleed differential diagnosis 1  Upper GI bleed:  Esophagitis gastritis duodenitis 2  Lower GI bleed:  Colitis diverticulosis AVM rectal mass  Will place place patient on Protonix bolus and drip will perform CT scan of the abdomen pelvis  15:30  Patient is very adamant about not wish to be admitted to the hospital    I pleaded with her to allow me to perform CT scans and then we would re-evaluate things    17:36  I spoke with patient and her  at length  Patient refused to be admitted  Patient also refused take Cipro or Flagyl prior to leaving today  She states she does not do well with antibiotics and will consider taking him as an outpatient    Patient will follow up with GI as an outpatient       Amount and/or Complexity of Data Reviewed  Clinical lab tests: reviewed  Tests in the radiology section of CPT®: reviewed  Tests in the medicine section of CPT®: reviewed    Risk of Complications, Morbidity, and/or Mortality  Presenting problems: high  Diagnostic procedures: high  Management options: high      Total time providing critical care: 45 min critical care time for Protonix bolus drip and management GI bleed         Disposition  Final diagnoses:   GI bleed   Colitis     Time reflects when diagnosis was documented in both MDM as applicable and the Disposition within this note     Time User Action Codes Description Comment    1/24/2019  2:53 PM Shirley Chatman [K92 2] GI bleed     1/24/2019  5:33 PM Shirley Crystal Add [K52 9] Colitis       ED Disposition     ED Disposition Condition Comment    Discharge  [address] discharge to home/self care  Condition at discharge: Good        Follow-up Information     Follow up With Specialties Details Why Marc Michael MD Gastroenterology   [address] [place], [place] [address]nandtatiana SánchezK[facility] Hospitalric[facility] [address] [facility]  [phone]            Patient's Medications   Discharge Prescriptions    CIPROFLOXACIN (CIPRO) 500 MG TABLET    Take 1 tablet (500 mg total) by mouth 2 (two) times a day for 7 days       Start Date: 1/24/2019 End Date: 1/31/2019       Order Dose: 500 mg       Quantity: 14 tablet    Refills: 0    METRONIDAZOLE (FLAGYL) 500 MG TABLET    Take 1 tablet (500 mg total) by mouth every 8 (eight) hours for 7 days       Start Date: 1/24/2019 End Date: 1/31/2019       Order Dose: 500 mg       Quantity: 21 tablet    Refills: 0     No discharge procedures on file      ED Provider  Electronically Signed by           Nita Ag DO  01/24/19 3981

## 2019-09-10 ENCOUNTER — TRANSCRIBE ORDERS (OUTPATIENT)
Dept: ADMINISTRATIVE | Facility: HOSPITAL | Age: 54
End: 2019-09-10

## 2019-09-10 ENCOUNTER — APPOINTMENT (OUTPATIENT)
Dept: LAB | Facility: MEDICAL CENTER | Age: 54
End: 2019-09-10
Payer: COMMERCIAL

## 2019-09-10 DIAGNOSIS — B37.0 THRUSH: Primary | ICD-10-CM

## 2019-09-10 DIAGNOSIS — B37.0 THRUSH: ICD-10-CM

## 2019-09-10 LAB — GLUCOSE P FAST SERPL-MCNC: 81 MG/DL (ref 65–99)

## 2019-09-10 PROCEDURE — 82947 ASSAY GLUCOSE BLOOD QUANT: CPT

## 2019-09-10 PROCEDURE — 36415 COLL VENOUS BLD VENIPUNCTURE: CPT

## 2019-12-23 ENCOUNTER — APPOINTMENT (OUTPATIENT)
Dept: LAB | Facility: MEDICAL CENTER | Age: 54
End: 2019-12-23
Payer: COMMERCIAL

## 2019-12-23 ENCOUNTER — TRANSCRIBE ORDERS (OUTPATIENT)
Dept: ADMINISTRATIVE | Facility: HOSPITAL | Age: 54
End: 2019-12-23

## 2019-12-23 DIAGNOSIS — R10.11 RUQ PAIN: ICD-10-CM

## 2019-12-23 DIAGNOSIS — R10.11 RUQ PAIN: Primary | ICD-10-CM

## 2019-12-23 LAB
ALBUMIN SERPL BCP-MCNC: 3.8 G/DL (ref 3.5–5)
ALP SERPL-CCNC: 83 U/L (ref 46–116)
ALT SERPL W P-5'-P-CCNC: 16 U/L (ref 12–78)
ANION GAP SERPL CALCULATED.3IONS-SCNC: 3 MMOL/L (ref 4–13)
AST SERPL W P-5'-P-CCNC: 10 U/L (ref 5–45)
BASOPHILS # BLD AUTO: 0.05 THOUSANDS/ΜL (ref 0–0.1)
BASOPHILS NFR BLD AUTO: 1 % (ref 0–1)
BILIRUB SERPL-MCNC: 0.28 MG/DL (ref 0.2–1)
BUN SERPL-MCNC: 13 MG/DL (ref 5–25)
CALCIUM SERPL-MCNC: 9.4 MG/DL (ref 8.3–10.1)
CHLORIDE SERPL-SCNC: 109 MMOL/L (ref 100–108)
CO2 SERPL-SCNC: 25 MMOL/L (ref 21–32)
CREAT SERPL-MCNC: 0.92 MG/DL (ref 0.6–1.3)
EOSINOPHIL # BLD AUTO: 0.09 THOUSAND/ΜL (ref 0–0.61)
EOSINOPHIL NFR BLD AUTO: 1 % (ref 0–6)
ERYTHROCYTE [DISTWIDTH] IN BLOOD BY AUTOMATED COUNT: 13.2 % (ref 11.6–15.1)
GFR SERPL CREATININE-BSD FRML MDRD: 70 ML/MIN/1.73SQ M
GLUCOSE SERPL-MCNC: 96 MG/DL (ref 65–140)
HCT VFR BLD AUTO: 40.7 % (ref 34.8–46.1)
HGB BLD-MCNC: 12.9 G/DL (ref 11.5–15.4)
IMM GRANULOCYTES # BLD AUTO: 0.03 THOUSAND/UL (ref 0–0.2)
IMM GRANULOCYTES NFR BLD AUTO: 0 % (ref 0–2)
LYMPHOCYTES # BLD AUTO: 3.33 THOUSANDS/ΜL (ref 0.6–4.47)
LYMPHOCYTES NFR BLD AUTO: 38 % (ref 14–44)
MCH RBC QN AUTO: 30.9 PG (ref 26.8–34.3)
MCHC RBC AUTO-ENTMCNC: 31.7 G/DL (ref 31.4–37.4)
MCV RBC AUTO: 97 FL (ref 82–98)
MONOCYTES # BLD AUTO: 0.56 THOUSAND/ΜL (ref 0.17–1.22)
MONOCYTES NFR BLD AUTO: 6 % (ref 4–12)
NEUTROPHILS # BLD AUTO: 4.64 THOUSANDS/ΜL (ref 1.85–7.62)
NEUTS SEG NFR BLD AUTO: 54 % (ref 43–75)
NRBC BLD AUTO-RTO: 0 /100 WBCS
PLATELET # BLD AUTO: 356 THOUSANDS/UL (ref 149–390)
PMV BLD AUTO: 10.1 FL (ref 8.9–12.7)
POTASSIUM SERPL-SCNC: 4 MMOL/L (ref 3.5–5.3)
PROT SERPL-MCNC: 7 G/DL (ref 6.4–8.2)
RBC # BLD AUTO: 4.18 MILLION/UL (ref 3.81–5.12)
SODIUM SERPL-SCNC: 137 MMOL/L (ref 136–145)
WBC # BLD AUTO: 8.7 THOUSAND/UL (ref 4.31–10.16)

## 2019-12-23 PROCEDURE — 85025 COMPLETE CBC W/AUTO DIFF WBC: CPT

## 2019-12-23 PROCEDURE — 80053 COMPREHEN METABOLIC PANEL: CPT

## 2019-12-23 PROCEDURE — 36415 COLL VENOUS BLD VENIPUNCTURE: CPT

## 2020-11-06 ENCOUNTER — NURSE TRIAGE (OUTPATIENT)
Dept: OTHER | Facility: OTHER | Age: 55
End: 2020-11-06

## 2020-11-06 DIAGNOSIS — Z20.828 EXPOSURE TO SARS-ASSOCIATED CORONAVIRUS: ICD-10-CM

## 2020-11-06 DIAGNOSIS — Z20.828 EXPOSURE TO SARS-ASSOCIATED CORONAVIRUS: Primary | ICD-10-CM

## 2020-11-06 PROCEDURE — U0003 INFECTIOUS AGENT DETECTION BY NUCLEIC ACID (DNA OR RNA); SEVERE ACUTE RESPIRATORY SYNDROME CORONAVIRUS 2 (SARS-COV-2) (CORONAVIRUS DISEASE [COVID-19]), AMPLIFIED PROBE TECHNIQUE, MAKING USE OF HIGH THROUGHPUT TECHNOLOGIES AS DESCRIBED BY CMS-2020-01-R: HCPCS | Performed by: FAMILY MEDICINE

## 2020-11-08 LAB — SARS-COV-2 RNA SPEC QL NAA+PROBE: NOT DETECTED

## 2021-12-10 ENCOUNTER — NURSE TRIAGE (OUTPATIENT)
Dept: OTHER | Facility: OTHER | Age: 56
End: 2021-12-10

## 2021-12-10 DIAGNOSIS — Z20.822 ENCOUNTER FOR SCREENING LABORATORY TESTING FOR COVID-19 VIRUS: Primary | ICD-10-CM

## 2021-12-10 PROCEDURE — U0005 INFEC AGEN DETEC AMPLI PROBE: HCPCS | Performed by: FAMILY MEDICINE

## 2021-12-10 PROCEDURE — U0003 INFECTIOUS AGENT DETECTION BY NUCLEIC ACID (DNA OR RNA); SEVERE ACUTE RESPIRATORY SYNDROME CORONAVIRUS 2 (SARS-COV-2) (CORONAVIRUS DISEASE [COVID-19]), AMPLIFIED PROBE TECHNIQUE, MAKING USE OF HIGH THROUGHPUT TECHNOLOGIES AS DESCRIBED BY CMS-2020-01-R: HCPCS | Performed by: FAMILY MEDICINE

## 2021-12-21 ENCOUNTER — NURSE TRIAGE (OUTPATIENT)
Dept: OTHER | Facility: OTHER | Age: 56
End: 2021-12-21

## 2021-12-21 DIAGNOSIS — Z20.828 SARS-ASSOCIATED CORONAVIRUS EXPOSURE: Primary | ICD-10-CM

## 2021-12-22 PROCEDURE — 87636 SARSCOV2 & INF A&B AMP PRB: CPT | Performed by: FAMILY MEDICINE

## 2022-04-05 ENCOUNTER — NURSE TRIAGE (OUTPATIENT)
Dept: OTHER | Facility: OTHER | Age: 57
End: 2022-04-05

## 2022-04-05 NOTE — TELEPHONE ENCOUNTER
Pt will be seen at her own pcp office (non) ([facility]) for exam and possible testing  She may go to [address] [place] [address] 256

## 2022-04-05 NOTE — TELEPHONE ENCOUNTER
1  Were you within 6 feet or less, for up to 15 minutes or more with a person that has a confirmed COVID-19 test? No known exposure  2  What was the date of your exposure? None known  3  Are you experiencing any symptoms attributed to the virus?  (Assess for SOB, cough, fever, difficulty breathing) dizziness and fatigues, sore throat and cough  4  HIGH RISK: Do you have any history heart or lung conditions, weakened immune system, diabetes, Asthma, CHF, HIV, COPD, Chemo, renal failure, sickle cell, etc? asthma  5   VACCINE: "Have you gotten the COVID-19 vaccine?" If Yes ask: "Which one, how many shots, when did you get it?" none

## 2022-04-05 NOTE — TELEPHONE ENCOUNTER
Regarding: COVID symptomatic - Dizziness, fatigue   ----- Message from Juan C Noriega sent at 4/5/2022  6:32 PM EDT -----  "i dont feel good, i dont know if its COVID or a sinus thing  Yesterday i felt woozy, like the room was spinning   i feel woozy again, i feel drained and tired  "

## 2022-04-05 NOTE — TELEPHONE ENCOUNTER
Additional Information   [1] Adult with possible COVID-19 symptoms AND [2] triager concerned about severity of symptoms or other causes    Protocols used: CORONAVIRUS (COVID-19) DIAGNOSED OR SUSPECTED-ADULT-

## 2022-07-05 ENCOUNTER — OFFICE VISIT (OUTPATIENT)
Dept: URGENT CARE | Facility: MEDICAL CENTER | Age: 57
End: 2022-07-05
Payer: COMMERCIAL

## 2022-07-05 VITALS
OXYGEN SATURATION: 98 % | RESPIRATION RATE: 18 BRPM | DIASTOLIC BLOOD PRESSURE: 70 MMHG | SYSTOLIC BLOOD PRESSURE: 140 MMHG | HEART RATE: 102 BPM | HEIGHT: 67 IN | BODY MASS INDEX: 26.68 KG/M2 | WEIGHT: 170 LBS | TEMPERATURE: 98.6 F

## 2022-07-05 DIAGNOSIS — S46.912A MUSCLE STRAIN, SHOULDER REGION, LEFT, INITIAL ENCOUNTER: Primary | ICD-10-CM

## 2022-07-05 PROCEDURE — G0382 LEV 3 HOSP TYPE B ED VISIT: HCPCS | Performed by: PHYSICIAN ASSISTANT

## 2022-07-05 NOTE — PROGRESS NOTES
[facility] [employer] [facility]        NAME: Ralph Olivia is a 61 y o  female  : 1965    MRN: [id number]  DATE: 2022  TIME: 6:08 PM    Assessment and Plan   Muscle strain, shoulder region, left, initial encounter [H65 113K]  1  Muscle strain, shoulder region, left, initial encounter  Ambulatory Referral to Physical Therapy     Pt appears clinically well  VSS  No acute distress  TTP to L rhomboid area which reproduces pain  Pt declines any medications  Pt only wants to take the childrens motrin  I educated her on adult dose of this medication  Referred to PT  Educated pt on indications to return  Pt states she understands and agrees to plan  Patient Instructions     Continue to monitor symptoms  If new or worsening symptoms develop, go immediately to Er  Drink plenty of fluids  Follow up with Family Doctor this week  Chief Complaint     Chief Complaint   Patient presents with    Shoulder Pain     Left shoulder pain that radiates down left arm          History of Present Illness       Shoulder Pain   Pain location: L shoulder blade  This is a new problem  Episode onset: Started about a week ago  pt spent 9 hours at her desk doing work and she started developing pain in this L shoulder  No acute trauma or injury  The problem occurs constantly  The problem has been waxing and waning  The quality of the pain is described as aching  The pain is at a severity of 8/10  Pertinent negatives include no fever, inability to bear weight, itching, joint locking, joint swelling, limited range of motion, numbness, stiffness or tingling  Treatments tried: Pt states she does not like medications  Pt states that she has been taking childrens motrin because thats the only medication she likes to take  Review of Systems   Review of Systems   Constitutional: Negative  Negative for chills, fatigue and fever  HENT: Negative  Eyes: Negative  Respiratory: Negative    Negative for chest tightness, shortness of breath and wheezing  Cardiovascular: Negative  Negative for chest pain and palpitations  Gastrointestinal: Negative for abdominal pain, constipation, diarrhea, nausea and vomiting  Endocrine: Negative  Genitourinary: Negative  Musculoskeletal: Positive for back pain  Negative for gait problem, neck pain, neck stiffness and stiffness  Skin: Negative  Negative for itching, pallor, rash and wound  Allergic/Immunologic: Negative  Neurological: Negative  Negative for tingling, weakness and numbness  Hematological: Negative  Psychiatric/Behavioral: Negative  Current Medications       Current Outpatient Medications:     ibuprofen (MOTRIN) 800 mg tablet, Take 1 tablet (800 mg total) by mouth every 8 (eight) hours as needed for mild pain, Disp: 30 tablet, Rfl: 0    levothyroxine 100 mcg tablet, Take 100 mcg by mouth, Disp: , Rfl:     Current Allergies     Allergies as of 07/05/2022    (No Known Allergies)            The following portions of the patient's history were reviewed and updated as appropriate: allergies, current medications, past family history, past medical history, past social history, past surgical history and problem list      Past Medical History:   Diagnosis Date    Asthma     Hypothyroid     IBS (irritable bowel syndrome)        Past Surgical History:   Procedure Laterality Date    APPENDECTOMY      HYSTERECTOMY      [place] LAP,APPENDECTOMY N/A 8/17/2018    Procedure: APPENDECTOMY LAPAROSCOPIC;  Surgeon: Cheryl Asher MD;  Location[facility];  Service: General    TONSILLECTOMY         History reviewed  No pertinent family history  Medications have been verified  Objective   /70   Pulse 102   Temp 98 6 °F (37 °C)   Resp 18   Ht 5' 7" (1 702 m)   Wt 77 1 kg (170 lb)   SpO2 98%   BMI 26 63 kg/m²        Physical Exam     Physical Exam  Vitals and nursing note reviewed     Constitutional:       General: She is not in acute distress  Appearance: Normal appearance  She is well-developed  She is not ill-appearing or diaphoretic  Comments: No acute distress   HENT:      Head: Normocephalic and atraumatic  Cardiovascular:      Rate and Rhythm: Normal rate and regular rhythm  Heart sounds: Normal heart sounds  Pulmonary:      Effort: Pulmonary effort is normal  No respiratory distress  Breath sounds: Normal breath sounds  No wheezing, rhonchi or rales  Musculoskeletal:      Cervical back: Normal range of motion and neck supple  Comments: Pain in L shoulder area reproducible with movement of L arm  TTP to medial trapezius and acute TTP to rhomboid area  This reproduces the pain exactly and sends the pain to L tricep area  No weaknes of LUE  Sensation and strength intact distally  Lymphadenopathy:      Cervical: No cervical adenopathy  Skin:     General: Skin is warm  Capillary Refill: Capillary refill takes less than 2 seconds  Findings: No rash  Neurological:      Mental Status: She is alert

## 2022-07-05 NOTE — PATIENT INSTRUCTIONS
Continue to monitor symptoms  If new or worsening symptoms develop, go immediately to Er  Drink plenty of fluids  Follow up with Family Doctor this week  Take liquid ibuprofen 10 mL (200mg) every 6 hours for pain relief  Buy over the counter Icy Hot lidocaine patch  Follow up with physical therapy  Muscle Strain   WHAT YOU NEED TO KNOW:   A muscle strain is a twist, pull, or tear of a muscle or tendon  A tendon is a strong elastic tissue that connects a muscle to a bone  Signs of a strained muscle include bruising and swelling over the area, pain with movement, and loss of strength  DISCHARGE INSTRUCTIONS:   Return to the emergency department if:   You suddenly cannot feel or move your injured muscle  Call your doctor if:   Your pain and swelling worsen or do not go away  You have questions or concerns about your condition or care  Medicines: You may need any of the following:  NSAIDs  help decrease swelling and pain or fever  This medicine is available with or without a doctor's order  NSAIDs can cause stomach bleeding or kidney problems in certain people  If you take blood thinner medicine, always ask your healthcare provider if NSAIDs are safe for you  Always read the medicine label and follow directions  Muscle relaxers  help decrease pain and muscle spasms  Take your medicine as directed  Contact your healthcare provider if you think your medicine is not helping or if you have side effects  Tell him of her if you are allergic to any medicine  Keep a list of the medicines, vitamins, and herbs you take  Include the amounts, and when and why you take them  Bring the list or the pill bottles to follow-up visits  Carry your medicine list with you in case of an emergency  Help a muscle strain heal:   3 to 7 days after the injury:  Use Rest, Ice, Compression, and Elevation (RICE) to help stop bruising and decrease pain and swelling      Rest your muscle to allow the injury to heal   When the pain decreases, begin normal, slow movements  For mild and moderate muscle strains, you should rest your muscles for about 2 days  If you have a severe muscle strain, you should rest for 10 to 14 days  You may need to use crutches to walk if your muscle strain is in your legs or lower body  Apply ice on the injured area  Use an ice pack, or put crushed ice in a plastic bag  Cover the bag with a towel before you apply it to your skin  Apply ice for 15 to 20 minutes each hour, or as directed  Use compression to decrease swelling  You can wrap an elastic bandage around the area to create compression  The bandage should be tight enough for you to feel support  Do not wrap it too tightly  Elevate the area above the level of your heart, if possible  Keep the injured muscle raised above your heart if possible  For example, if you have a strain of your lower leg muscle, lie down and prop your leg up on pillows  This helps decrease pain and swelling  3 to 21 days after your injury:  Start to slowly and regularly exercise your strained muscle  This will help it heal  If you feel pain, decrease how hard you are exercising  1 to 6 weeks after your injury:  Stretch the injured muscle  Stretch the muscle for about 30 seconds  Do this 4 times a day  You may stretch the muscle until you feel a slight pull  Stop stretching if you feel pain  2 weeks to 6 months after your injury:  The goal of this phase is to return to the activity you were doing before the injury without hurting the muscle again  3 weeks to 6 months after your injury:  Keep stretching and strengthening your muscles to prevent injury  Slowly increase the time and distance that you exercise  You may still have signs and symptoms of muscle strain 6 months after the injury, even if you do things to help it heal  In this case, you may need surgery on the muscle      Prevent muscle strains:   Always wear proper shoes when you play sports  Replace your old running shoes with new ones often if you are a runner  Use special shoe inserts or arch supports to correct leg or foot problems  Ask your healthcare provider for more information on shoe supports  Do warm up and cool down exercises  Do stretching exercises before you work out or do sports activities  These exercises will help loosen and decrease stress on your muscles  Cool down and stretch after your workout  Do not stop and rest after a workout without cooling down first          Keep your muscles strong with strength training exercises  Exercises such as weight lifting and stretching exercises help keep your muscles flexible and strong  A physical therapist or  may help you with these exercises  Slowly start your exercise or sports training program   Follow your healthcare provider's advice on when to start exercising  Slowly increase time, distance, and how often you train  Sudden increases in how often you train may cause you to injure your muscle again  Follow up with your doctor as directed: Your doctor may suggest that you have a follow-up visit before you go back to your usual activity  Write down your questions so you remember to ask them during your visits  © Copyright [employer] 2022 Information is for End User's use only and may not be sold, redistributed or otherwise used for commercial purposes  All illustrations and images included in CareNotes® are the copyrighted property of A D A M , Inc  or [facility] Frank Mccormack   The above information is an  only  It is not intended as medical advice for individual conditions or treatments  Talk to your doctor, nurse or pharmacist before following any medical regimen to see if it is safe and effective for you

## 2022-10-07 ENCOUNTER — OFFICE VISIT (OUTPATIENT)
Dept: URGENT CARE | Facility: MEDICAL CENTER | Age: 57
End: 2022-10-07
Payer: COMMERCIAL

## 2022-10-07 VITALS
TEMPERATURE: 98.6 F | HEART RATE: 88 BPM | WEIGHT: 170 LBS | OXYGEN SATURATION: 97 % | RESPIRATION RATE: 18 BRPM | HEIGHT: 66 IN | BODY MASS INDEX: 27.32 KG/M2 | DIASTOLIC BLOOD PRESSURE: 80 MMHG | SYSTOLIC BLOOD PRESSURE: 130 MMHG

## 2022-10-07 DIAGNOSIS — H66.92 LEFT OTITIS MEDIA, UNSPECIFIED OTITIS MEDIA TYPE: Primary | ICD-10-CM

## 2022-10-07 DIAGNOSIS — J06.9 ACUTE URI: ICD-10-CM

## 2022-10-07 DIAGNOSIS — R05.1 ACUTE COUGH: ICD-10-CM

## 2022-10-07 LAB
SARS-COV-2 AG UPPER RESP QL IA: NEGATIVE
VALID CONTROL: NORMAL

## 2022-10-07 PROCEDURE — G0381 LEV 2 HOSP TYPE B ED VISIT: HCPCS | Performed by: PHYSICIAN ASSISTANT

## 2022-10-07 PROCEDURE — 87811 SARS-COV-2 COVID19 W/OPTIC: CPT | Performed by: PHYSICIAN ASSISTANT

## 2022-10-07 RX ORDER — AMOXICILLIN 400 MG/5ML
POWDER, FOR SUSPENSION ORAL
Qty: 200 ML | Refills: 0 | Status: SHIPPED | OUTPATIENT
Start: 2022-10-07 | End: 2022-10-17

## 2022-10-07 NOTE — PROGRESS NOTES
[facility]        NAME: Malinda Campo is a 61 y o  female  : 1965    MRN: [id number]  DATE: 2022  TIME: 5:19 PM    Assessment and Plan   Left otitis media, unspecified otitis media type [H66 92]  1  Left otitis media, unspecified otitis media type  amoxicillin (AMOXIL) 400 MG/5ML suspension   2  Acute URI     3  Acute cough  Poct Covid 19 Rapid Antigen Test         Patient Instructions       Follow up with PCP in 3-5 days  Proceed to  ER if symptoms worsen  Chief Complaint     Chief Complaint   Patient presents with    Cold Like Symptoms     Sinus pressure and congestion, and vomiting , cough that started yesterday          History of Present Illness       Patient presents with sinus pressure frontal headaches nasal congestion vomiting and a cough which started this morning  No fever chills or exposure to COVID  Review of Systems   Review of Systems   Constitutional: Negative for chills and fever  HENT: Positive for postnasal drip and rhinorrhea  Negative for congestion and sore throat  Respiratory: Positive for cough and wheezing  Negative for shortness of breath  Gastrointestinal: Positive for diarrhea  Negative for nausea and vomiting  Musculoskeletal: Negative for myalgias  Skin: Negative for rash  Neurological: Positive for headaches           Current Medications       Current Outpatient Medications:     amoxicillin (AMOXIL) 400 MG/5ML suspension, 2 tsp twice daily x 10 days, Disp: 200 mL, Rfl: 0    ibuprofen (MOTRIN) 800 mg tablet, Take 1 tablet (800 mg total) by mouth every 8 (eight) hours as needed for mild pain, Disp: 30 tablet, Rfl: 0    levothyroxine 100 mcg tablet, Take 100 mcg by mouth, Disp: , Rfl:     Current Allergies     Allergies as of 10/07/2022    (No Known Allergies)            The following portions of the patient's history were reviewed and updated as appropriate: allergies, current medications, past family history, past medical history, past social history, past surgical history and problem list      Past Medical History:   Diagnosis Date    Asthma     Hypothyroid     IBS (irritable bowel syndrome)        Past Surgical History:   Procedure Laterality Date    APPENDECTOMY      HYSTERECTOMY      NM LAP,APPENDECTOMY N/A 8/17/2018    Procedure: APPENDECTOMY LAPAROSCOPIC;  Surgeon: Debborah Lundborg, MD;  Location: [facility];  Service: General    TONSILLECTOMY         History reviewed  No pertinent family history  Medications have been verified  Objective   /80   Pulse 88   Temp 98 6 °F (37 °C)   Resp 18   Ht 5' 6" (1 676 m)   Wt 77 1 kg (170 lb)   SpO2 97%   BMI 27 44 kg/m²   No LMP recorded  Patient has had a hysterectomy  Physical Exam     Physical Exam  Vitals and nursing note reviewed  Constitutional:       Appearance: Normal appearance  HENT:      Head: Normocephalic and atraumatic  Right Ear: Tympanic membrane normal       Ears:      Comments: Left TM erythema and diminished light reflex     Mouth/Throat:      Mouth: Mucous membranes are moist       Pharynx: Oropharynx is clear  Eyes:      Conjunctiva/sclera: Conjunctivae normal    Cardiovascular:      Rate and Rhythm: Normal rate and regular rhythm  Heart sounds: Normal heart sounds  Pulmonary:      Effort: Pulmonary effort is normal       Breath sounds: Normal breath sounds  Skin:     General: Skin is warm  Neurological:      Mental Status: She is alert

## 2022-12-29 ENCOUNTER — OFFICE VISIT (OUTPATIENT)
Dept: URGENT CARE | Facility: MEDICAL CENTER | Age: 57
End: 2022-12-29

## 2022-12-29 VITALS
SYSTOLIC BLOOD PRESSURE: 103 MMHG | TEMPERATURE: 98.8 F | OXYGEN SATURATION: 94 % | WEIGHT: 168 LBS | HEIGHT: 67 IN | RESPIRATION RATE: 20 BRPM | HEART RATE: 103 BPM | DIASTOLIC BLOOD PRESSURE: 84 MMHG | BODY MASS INDEX: 26.37 KG/M2

## 2022-12-29 DIAGNOSIS — M94.0 COSTOCHONDRITIS: ICD-10-CM

## 2022-12-29 DIAGNOSIS — R05.1 ACUTE COUGH: ICD-10-CM

## 2022-12-29 DIAGNOSIS — J06.9 VIRAL URI WITH COUGH: Primary | ICD-10-CM

## 2022-12-29 RX ORDER — AMOXICILLIN AND CLAVULANATE POTASSIUM 400; 57 MG/5ML; MG/5ML
400 POWDER, FOR SUSPENSION ORAL 2 TIMES DAILY
Qty: 70 ML | Refills: 0 | Status: SHIPPED | OUTPATIENT
Start: 2022-12-29 | End: 2023-01-05

## 2022-12-29 RX ORDER — ACETAMINOPHEN 160 MG/5ML
320 SUSPENSION ORAL EVERY 4 HOURS PRN
Qty: 118 ML | Refills: 0 | Status: SHIPPED | OUTPATIENT
Start: 2022-12-29

## 2022-12-29 NOTE — PATIENT INSTRUCTIONS
Take prescribed Tylenol and ibuprofen as instructed  Make sure to alternate  Plenty of fluids and rest   May try tea with honey for throat/cough relief  May try over-the-counter cold/flu medication  To the ER if symptoms do not improve or worsen  Check MyChart in 1 to 2 days for results of COVID/flu swab  Follow up with PCP in 3-5 days  Proceed to  ER if symptoms worsen  Upper Respiratory Infection   WHAT YOU NEED TO KNOW:   An upper respiratory infection is also called a cold  It can affect your nose, throat, ears, and sinuses  Cold symptoms are usually worst for the first 3 to 5 days  Most people get better in 7 to 14 days  You may continue to cough for 2 to 3 weeks  Colds are caused by viruses and do not get better with antibiotics  DISCHARGE INSTRUCTIONS:   Call your local emergency number (911 in the [address] [place] [address],[address]) if:   You have chest pain or trouble breathing  Return to the emergency department if:   You have a fever over 102ºF (39ºC)  Call your doctor if:   You have a low fever  Your sore throat gets worse or you see white or yellow spots in your throat  Your symptoms get worse after 3 to 5 days or are not better in 14 days  You have a rash anywhere on your skin  You have large, tender lumps in your neck  You have thick, green, or yellow drainage from your nose  You cough up thick yellow, green, or bloody mucus  You have a bad earache  You have questions or concerns about your condition or care  Medicines: You may need any of the following:  Decongestants  help reduce nasal congestion and help you breathe more easily  If you take decongestant pills, they may make you feel restless or cause problems with your sleep  Do not use decongestant sprays for more than a few days  Cough suppressants  help reduce coughing  Ask your healthcare provider which type of cough medicine is best for you  NSAIDs , such as ibuprofen, help decrease swelling, pain, and fever   NSAIDs can cause stomach bleeding or kidney problems in certain people  If you take blood thinner medicine, always ask your healthcare provider if NSAIDs are safe for you  Always read the medicine label and follow directions  Acetaminophen  decreases pain and fever  It is available without a doctor's order  Ask how much to take and how often to take it  Follow directions  Read the labels of all other medicines you are using to see if they also contain acetaminophen, or ask your doctor or pharmacist  Acetaminophen can cause liver damage if not taken correctly  Do not use more than 4 grams (4,000 milligrams) total of acetaminophen in one day  Take your medicine as directed  Contact your healthcare provider if you think your medicine is not helping or if you have side effects  Tell him or her if you are allergic to any medicine  Keep a list of the medicines, vitamins, and herbs you take  Include the amounts, and when and why you take them  Bring the list or the pill bottles to follow-up visits  Carry your medicine list with you in case of an emergency  Self-care:   Rest as much as possible  Slowly start to do more each day  Drink more liquids as directed  Liquids will help thin and loosen mucus so you can cough it up  Liquids will also help prevent dehydration  Liquids that help prevent dehydration include water, fruit juice, and broth  Do not drink liquids that contain caffeine  Caffeine can increase your risk for dehydration  Ask your healthcare provider how much liquid to drink each day  Soothe a sore throat  Gargle with warm salt water  Make salt water by dissolving ¼ teaspoon salt in 1 cup warm water  You may also suck on hard candy or throat lozenges  You may use a sore throat spray  Use a humidifier or vaporizer  Use a cool mist humidifier or a vaporizer to increase air moisture in your home  This may make it easier for you to breathe and help decrease your cough      Use saline nasal drops as directed  These help relieve congestion  Apply petroleum-based jelly around the outside of your nostrils  This can decrease irritation from blowing your nose  Do not smoke  Nicotine and other chemicals in cigarettes and cigars can make your symptoms worse  They can also cause infections such as bronchitis or pneumonia  Ask your healthcare provider for information if you currently smoke and need help to quit  E-cigarettes or smokeless tobacco still contain nicotine  Talk to your healthcare provider before you use these products  Prevent a cold: Wash your hands often  Use soap and water every time you wash your hands  Rub your soapy hands together, lacing your fingers  Use the fingers of one hand to scrub under the nails of the other hand  Wash for at least 20 seconds  Rinse with warm, running water for several seconds  Then dry your hands  Use germ-killing gel if soap and water are not available  Do not touch your eyes or mouth without washing your hands first          Cover a sneeze or cough  Use a tissue that covers your mouth and nose  Put the used tissue in the trash right away  Use the bend of your arm if a tissue is not available  Wash your hands well with soap and water or use a hand   Do not stand close to anyone who is sneezing or coughing  Try to stay away from others while you are sick  This is especially important during the first 2 to 3 days when the virus is more easily spread  Wait until a fever, cough, or other symptoms are gone before you return to work or other regular activities  Do not share items while you are sick  This includes food, drinks, eating utensils, and dishes  Follow up with your doctor as directed:  Write down your questions so you remember to ask them during your visits  © Copyright [employer] 2022 Information is for End User's use only and may not be sold, redistributed or otherwise used for commercial purposes   All illustrations and images included in [employer] are the copyrighted property of A D A M , Inc  or [facility] Frank Mccormack   The above information is an  only  It is not intended as medical advice for individual conditions or treatments  Talk to your doctor, nurse or pharmacist before following any medical regimen to see if it is safe and effective for you  Costochondritis   WHAT YOU NEED TO KNOW:   Costochondritis is a condition that causes pain in the cartilage that connect your ribs to your sternum (breastbone)  Cartilage is the tough, bendable tissue that protects your bones  DISCHARGE INSTRUCTIONS:   Medicines:   Acetaminophen: This medicine decreases pain  Acetaminophen is available without a doctor's order  Ask how much to take and how often to take it  Follow directions  Acetaminophen can cause liver damage if not taken correctly  NSAIDs , such as ibuprofen, help decrease swelling, pain, and fever  This medicine is available with or without a doctor's order  NSAIDs can cause stomach bleeding or kidney problems in certain people  If you take blood thinner medicine, always ask if NSAIDs are safe for you  Always read the medicine label and follow directions  Do not give these medicines to children under 10months of age without direction from your child's healthcare provider  Take your medicine as directed  Contact your healthcare provider if you think your medicine is not helping or if you have side effects  Tell him of her if you are allergic to any medicine  Keep a list of the medicines, vitamins, and herbs you take  Include the amounts, and when and why you take them  Bring the list or the pill bottles to follow-up visits  Carry your medicine list with you in case of an emergency  Follow up with your doctor as directed:  Write down your questions so you remember to ask them during your visits  Rest:  You may need to get more rest  Learn which movements and activities cause pain, and avoid doing them   Do not carry objects, such as a purse or backpack, if this is painful  Avoid activities such as rowing and weightlifting until your pain decreases or goes away  Ask which activities are best for you to do while you recover  Heat:  Heat helps decrease pain in some patients  Apply heat on the area for 20 to 30 minutes every 2 hours for as many days as directed  Ice:  Ice helps decrease swelling and pain  Ice may also help prevent tissue damage  Use an ice pack, or put crushed ice in a plastic bag  Cover it with a towel and place it on the painful area for 15 to 20 minutes every hour or as directed  Stretching exercises:  Gentle stretching may help your symptoms   a doorway and put your hands on the door frame at the level of your ears or shoulders  Take 1 step forward and gently stretch your chest  Try this with your hands higher up on the doorway  Contact your healthcare provider if:   You have a fever  The painful areas of your chest look swollen, red, and feel warm to the touch  You cannot sleep because of the pain  You have questions or concerns about your condition or care  © Copyright [employer] 2022 Information is for End User's use only and may not be sold, redistributed or otherwise used for commercial purposes  All illustrations and images included in CareNotes® are the copyrighted property of A D A M , Inc  or Marco Luciano  The above information is an  only  It is not intended as medical advice for individual conditions or treatments  Talk to your doctor, nurse or pharmacist before following any medical regimen to see if it is safe and effective for you

## 2022-12-29 NOTE — PROGRESS NOTES
[facility] [employer] [facility]        NAME: Arlen Avendano is a 61 y o  female  : 1965    MRN: [id number]  DATE: 2022  TIME: 6:51 PM    Assessment and Plan   Viral URI with cough [J06 9]  1  Viral URI with cough  acetaminophen (TYLENOL) 160 mg/5 mL liquid      2  Costochondritis  ibuprofen (MOTRIN) 100 mg/5 mL suspension        Patient informed that she most likely has a viral illness that she caught from a family member over the holidays  Exam was relatively normal other than some upper respiratory congestion and some reproducible right-sided rib pain  Patient was advised to with smoking  Since PT cannot take pills and is unable to find liquid Children's Motrin/Tylenol, will send in prescription for both  Patient was advised to go to the emergency room if symptoms worsen  Due to history of smoking, will also send Rx for augmentin  Patient Instructions     Take prescribed Tylenol and ibuprofen as instructed  Make sure to alternate  Plenty of fluids and rest   May try tea with honey for throat/cough relief  May try over-the-counter cold/flu medication  To the ER if symptoms do not improve or worsen  Check MyChart in 1 to 2 days for results of COVID/flu swab  Follow up with PCP in 3-5 days  Proceed to  ER if symptoms worsen  Chief Complaint     Chief Complaint   Patient presents with   • Flu Symptoms     Fever, congestion, chest congestion, right rib pain, sore throat, body aches, started Tuesday, pt  "Doesn't take pills" and can't find "liquid" motrin because she can't find anything otc, tested negative for covid last night          History of Present Illness       54-year-old female presents with fever of 101F, congestion, rib pain, cough, ear pain, and sore throat  Symptoms began 2 days ago  PT states she cannot take pills due to throat issue, has not been able to find any Tylenol or Motrin and liquid forms at the pharmacy  PT admits to a mild headache    PT has been drinking plenty of fluids appropriately  PT states that the pain in her ribs is on the right side  Worse with taking deep breaths and coughing  Denies any injury or fall  She denies any chills, chest pain, shortness of breath, abdominal pain, nausea, vomiting, diarrhea  PT states a young relative of hers was sick with a flulike illness over the holidays and she was around him  Patient smokes 1 pack/day  Review of Systems   Review of Systems   Constitutional: Positive for fever  Negative for appetite change and chills  HENT: Positive for congestion, ear pain, rhinorrhea, sinus pressure and sore throat  Negative for ear discharge and sinus pain  Eyes: Negative  Respiratory: Positive for cough  Negative for shortness of breath and wheezing  Cardiovascular: Negative for chest pain and palpitations  Gastrointestinal: Negative for abdominal pain, constipation, diarrhea, nausea and vomiting  Musculoskeletal: Positive for arthralgias and myalgias  Skin: Negative  Neurological: Positive for headaches  Negative for dizziness, syncope, weakness, light-headedness and numbness           Current Medications       Current Outpatient Medications:   •  acetaminophen (TYLENOL) 160 mg/5 mL liquid, Take 10 mL (320 mg total) by mouth every 4 (four) hours as needed for fever, Disp: 118 mL, Rfl: 0  •  ibuprofen (MOTRIN) 100 mg/5 mL suspension, Take 20 mL (400 mg total) by mouth every 6 (six) hours as needed for mild pain, Disp: 150 mL, Rfl: 0  •  levothyroxine 100 mcg tablet, Take 100 mcg by mouth, Disp: , Rfl:   •  ibuprofen (MOTRIN) 800 mg tablet, Take 1 tablet (800 mg total) by mouth every 8 (eight) hours as needed for mild pain (Patient not taking: Reported on 12/29/2022), Disp: 30 tablet, Rfl: 0    Current Allergies     Allergies as of 12/29/2022 - Reviewed 12/29/2022   Allergen Reaction Noted   • Zithromax [azithromycin] Other (See Comments) 12/29/2022            The following portions of the patient's history were reviewed and updated as appropriate: allergies, current medications, past family history, past medical history, past social history, past surgical history and problem list      Past Medical History:   Diagnosis Date   • Asthma    • Hypothyroid    • IBS (irritable bowel syndrome)        Past Surgical History:   Procedure Laterality Date   • APPENDECTOMY     • HYSTERECTOMY     • RI LAPAROSCOPIC APPENDECTOMY N/A 8/17/2018    Procedure: APPENDECTOMY LAPAROSCOPIC;  Surgeon: Cory Sandhu MD;  Location: [facility];  Service: General   • TONSILLECTOMY         No family history on file  Medications have been verified  Objective   /84   Pulse 103   Temp 98 8 °F (37 1 °C) (Oral)   Resp 20   Ht 5' 7" (1 702 m)   Wt 76 2 kg (168 lb)   SpO2 94%   BMI 26 31 kg/m²        Physical Exam     Physical Exam  Constitutional:       General: She is not in acute distress  Appearance: Normal appearance  HENT:      Head: Normocephalic and atraumatic  Right Ear: Tympanic membrane, ear canal and external ear normal       Left Ear: Tympanic membrane, ear canal and external ear normal       Nose: Congestion present  Mouth/Throat:      Mouth: Mucous membranes are moist       Pharynx: Oropharynx is clear  No oropharyngeal exudate or posterior oropharyngeal erythema  Eyes:      Extraocular Movements: Extraocular movements intact  Conjunctiva/sclera: Conjunctivae normal       Pupils: Pupils are equal, round, and reactive to light  Cardiovascular:      Rate and Rhythm: Normal rate and regular rhythm  Pulses: Normal pulses  Heart sounds: Normal heart sounds  No murmur heard  No friction rub  No gallop  Pulmonary:      Effort: Pulmonary effort is normal  No respiratory distress  Breath sounds: Normal breath sounds  No stridor  No decreased breath sounds, wheezing, rhonchi or rales  Comments: Coughs with deep inspiration    Reproducible right-sided rib pain with deep inspiration  Also reproducible with palpation  No obvious deformity  Symmetrical expansion bilaterally  Chest:      Chest wall: No tenderness  Musculoskeletal:      Cervical back: Normal range of motion and neck supple  No tenderness  Lymphadenopathy:      Cervical: No cervical adenopathy  Skin:     General: Skin is warm and dry  Capillary Refill: Capillary refill takes less than 2 seconds  Findings: No rash  Neurological:      General: No focal deficit present  Mental Status: She is alert

## 2022-12-30 LAB
FLUAV RNA RESP QL NAA+PROBE: NEGATIVE
FLUBV RNA RESP QL NAA+PROBE: NEGATIVE
SARS-COV-2 RNA RESP QL NAA+PROBE: NEGATIVE

## 2023-03-22 ENCOUNTER — HOSPITAL ENCOUNTER (EMERGENCY)
Facility: HOSPITAL | Age: 58
Discharge: HOME | End: 2023-03-22
Attending: EMERGENCY MEDICINE

## 2023-03-22 ENCOUNTER — APPOINTMENT (EMERGENCY)
Dept: CT IMAGING | Facility: HOSPITAL | Age: 58
End: 2023-03-22

## 2023-03-22 VITALS
WEIGHT: 160.72 LBS | HEIGHT: 67 IN | DIASTOLIC BLOOD PRESSURE: 64 MMHG | BODY MASS INDEX: 25.22 KG/M2 | RESPIRATION RATE: 13 BRPM | SYSTOLIC BLOOD PRESSURE: 102 MMHG | TEMPERATURE: 98.6 F | OXYGEN SATURATION: 95 % | HEART RATE: 65 BPM

## 2023-03-22 DIAGNOSIS — R79.89 ELEVATED TSH: ICD-10-CM

## 2023-03-22 DIAGNOSIS — R42 VERTIGO: ICD-10-CM

## 2023-03-22 DIAGNOSIS — R42 DIZZINESS: Primary | ICD-10-CM

## 2023-03-22 DIAGNOSIS — J32.0 RIGHT MAXILLARY SINUSITIS: ICD-10-CM

## 2023-03-22 LAB
ALBUMIN SERPL BCP-MCNC: 4.1 G/DL (ref 3.5–5)
ALP SERPL-CCNC: 69 U/L (ref 34–104)
ALT SERPL W P-5'-P-CCNC: 9 U/L (ref 7–52)
ANION GAP SERPL CALCULATED.3IONS-SCNC: 5 MMOL/L (ref 4–13)
APTT PPP: 28 SECONDS (ref 23–37)
AST SERPL W P-5'-P-CCNC: 13 U/L (ref 13–39)
ATRIAL RATE: 75 BPM
BASOPHILS # BLD AUTO: 0.04 THOUSANDS/ÂΜL (ref 0–0.1)
BASOPHILS NFR BLD AUTO: 0 % (ref 0–1)
BILIRUB SERPL-MCNC: 0.37 MG/DL (ref 0.2–1)
BILIRUB UR QL STRIP: NEGATIVE
BUN SERPL-MCNC: 13 MG/DL (ref 5–25)
CALCIUM SERPL-MCNC: 9.5 MG/DL (ref 8.4–10.2)
CARDIAC TROPONIN I PNL SERPL HS: <2 NG/L
CHLORIDE SERPL-SCNC: 107 MMOL/L (ref 96–108)
CLARITY UR: CLEAR
CO2 SERPL-SCNC: 27 MMOL/L (ref 21–32)
COLOR UR: NORMAL
CREAT SERPL-MCNC: 0.89 MG/DL (ref 0.6–1.3)
EOSINOPHIL # BLD AUTO: 0.1 THOUSAND/ÂΜL (ref 0–0.61)
EOSINOPHIL NFR BLD AUTO: 1 % (ref 0–6)
ERYTHROCYTE [DISTWIDTH] IN BLOOD BY AUTOMATED COUNT: 13.8 % (ref 11.6–15.1)
GFR SERPL CREATININE-BSD FRML MDRD: 71 ML/MIN/1.73SQ M
GLUCOSE SERPL-MCNC: 99 MG/DL (ref 65–140)
GLUCOSE UR STRIP-MCNC: NEGATIVE MG/DL
HCT VFR BLD AUTO: 42.5 % (ref 34.8–46.1)
HGB BLD-MCNC: 14.1 G/DL (ref 11.5–15.4)
HGB UR QL STRIP.AUTO: NEGATIVE
IMM GRANULOCYTES # BLD AUTO: 0.02 THOUSAND/UL (ref 0–0.2)
IMM GRANULOCYTES NFR BLD AUTO: 0 % (ref 0–2)
INR PPP: 0.93 (ref 0.84–1.19)
KETONES UR STRIP-MCNC: NEGATIVE MG/DL
LEUKOCYTE ESTERASE UR QL STRIP: NEGATIVE
LYMPHOCYTES # BLD AUTO: 3.29 THOUSANDS/ÂΜL (ref 0.6–4.47)
LYMPHOCYTES NFR BLD AUTO: 37 % (ref 14–44)
MAGNESIUM SERPL-MCNC: 2.1 MG/DL (ref 1.9–2.7)
MCH RBC QN AUTO: 31.5 PG (ref 26.8–34.3)
MCHC RBC AUTO-ENTMCNC: 33.2 G/DL (ref 31.4–37.4)
MCV RBC AUTO: 95 FL (ref 82–98)
MONOCYTES # BLD AUTO: 0.72 THOUSAND/ÂΜL (ref 0.17–1.22)
MONOCYTES NFR BLD AUTO: 8 % (ref 4–12)
NEUTROPHILS # BLD AUTO: 4.77 THOUSANDS/ÂΜL (ref 1.85–7.62)
NEUTS SEG NFR BLD AUTO: 54 % (ref 43–75)
NITRITE UR QL STRIP: NEGATIVE
NRBC BLD AUTO-RTO: 0 /100 WBCS
P AXIS: 70 DEGREES
PH UR STRIP.AUTO: 6.5 [PH]
PLATELET # BLD AUTO: 351 THOUSANDS/UL (ref 149–390)
PMV BLD AUTO: 10 FL (ref 8.9–12.7)
POTASSIUM SERPL-SCNC: 4.1 MMOL/L (ref 3.5–5.3)
PR INTERVAL: 150 MS
PROT SERPL-MCNC: 6.4 G/DL (ref 6.4–8.4)
PROT UR STRIP-MCNC: NEGATIVE MG/DL
PROTHROMBIN TIME: 12.6 SECONDS (ref 11.6–14.5)
QRS AXIS: 34 DEGREES
QRSD INTERVAL: 76 MS
QT INTERVAL: 398 MS
QTC INTERVAL: 444 MS
RBC # BLD AUTO: 4.47 MILLION/UL (ref 3.81–5.12)
SODIUM SERPL-SCNC: 139 MMOL/L (ref 135–147)
SP GR UR STRIP.AUTO: <=1.005 (ref 1–1.03)
T WAVE AXIS: 50 DEGREES
T4 FREE SERPL-MCNC: 1.12 NG/DL (ref 0.76–1.46)
TSH SERPL DL<=0.05 MIU/L-ACNC: 8.32 UIU/ML (ref 0.45–4.5)
UROBILINOGEN UR QL STRIP.AUTO: 0.2 E.U./DL
VENTRICULAR RATE: 75 BPM
WBC # BLD AUTO: 8.94 THOUSAND/UL (ref 4.31–10.16)

## 2023-03-22 RX ORDER — ONDANSETRON 2 MG/ML
4 INJECTION INTRAMUSCULAR; INTRAVENOUS ONCE
Status: COMPLETED | OUTPATIENT
Start: 2023-03-22 | End: 2023-03-22

## 2023-03-22 RX ORDER — AMOXICILLIN 400 MG/5ML
500 POWDER, FOR SUSPENSION ORAL 3 TIMES DAILY
Qty: 132.3 ML | Refills: 0 | Status: SHIPPED | OUTPATIENT
Start: 2023-03-22 | End: 2023-03-29

## 2023-03-22 RX ORDER — MECLIZINE HCL 12.5 MG/1
25 TABLET ORAL ONCE
Status: COMPLETED | OUTPATIENT
Start: 2023-03-22 | End: 2023-03-22

## 2023-03-22 RX ORDER — LORAZEPAM 2 MG/ML
0.5 INJECTION INTRAMUSCULAR ONCE
Status: COMPLETED | OUTPATIENT
Start: 2023-03-22 | End: 2023-03-22

## 2023-03-22 RX ORDER — ONDANSETRON 4 MG/1
4 TABLET, ORALLY DISINTEGRATING ORAL EVERY 6 HOURS PRN
Qty: 12 TABLET | Refills: 0 | Status: SHIPPED | OUTPATIENT
Start: 2023-03-22

## 2023-03-22 RX ORDER — LORAZEPAM 0.5 MG/1
0.5 TABLET ORAL EVERY 8 HOURS PRN
Qty: 6 TABLET | Refills: 0 | Status: SHIPPED | OUTPATIENT
Start: 2023-03-22

## 2023-03-22 RX ORDER — MECLIZINE HYDROCHLORIDE 25 MG/1
25 TABLET ORAL 3 TIMES DAILY PRN
Qty: 30 TABLET | Refills: 0 | Status: SHIPPED | OUTPATIENT
Start: 2023-03-22

## 2023-03-22 RX ORDER — LEVOTHYROXINE SODIUM 0.1 MG/1
100 TABLET ORAL DAILY
COMMUNITY

## 2023-03-22 RX ADMIN — MECLIZINE 25 MG: 12.5 TABLET ORAL at 10:07

## 2023-03-22 RX ADMIN — SODIUM CHLORIDE 1000 ML: 0.9 INJECTION, SOLUTION INTRAVENOUS at 10:00

## 2023-03-22 RX ADMIN — LORAZEPAM 0.5 MG: 2 INJECTION INTRAMUSCULAR; INTRAVENOUS at 12:32

## 2023-03-22 RX ADMIN — ONDANSETRON 4 MG: 2 INJECTION INTRAMUSCULAR; INTRAVENOUS at 10:01

## 2023-03-22 NOTE — DISCHARGE INSTRUCTIONS
Rest, plenty of fluids  Zofran as needed for nausea/vomiting  Meclizine as needed for dizziness  Ativan can also be used for dizziness or anxiety  Take antibiotic as directed for the full duration for your sinuses  I have placed a referral to PT for vestibular therapy  Can follow and try epley maneuver at home - see below diagram   Follow up with PCP for recheck or return to ER as needed

## 2023-03-22 NOTE — Clinical Note
Mary Araujo was seen and treated in our emergency department on 3/22/2023  Diagnosis:     Doramorn Ekta    She may return on this date: 03/27/2023         If you have any questions or concerns, please don't hesitate to call        Dannial Dandy, PA-C    ______________________________           _______________          _______________  Hospital Representative                              Date                                Time

## 2023-03-22 NOTE — ED PROVIDER NOTES
History  Chief Complaint   Patient presents with   • Dizziness     Started 2 weeks ago was just with movement changes, today it is worse it is continuously  Nauseous, no vomitting  61year old female with PMH hypothyroidism, IBS, asthma, h/o colitis presents for evaluation of dizziness  Pt reports this started approximately 2 weeks ago  She reports dizziness which she describes as a spinning when she stands up, moves or turns her head  She states it has been getting worse and now occurs with even the slightest movements  Associated nausea  Denies vomiting  Denies headache  Denies visual disturbance  Denies numbness, tingling, weakness  No reported difficulty with speech  There has been no reported falls, head injury/trauma or syncope  Denies cough, congestion or recent illness  Denies chest pain, SOB  Denies D/C, abdominal pain  Denies any urinary complaints  No reported alleviating factors other than remaining still  No specific treatments tried  No prior evaluation since onset of symptoms  No prior history of vertigo in the past   She states she's never had anything like this in the past   She denies any recent changes in her health or medications  Review of her chart indicates she had a positive cologard test on 2/17/23 but did not yet have follow up colonoscopy  History provided by:  Patient and medical records   used: No    Dizziness  Quality:  Room spinning and head spinning  Duration:  2 weeks  Progression:  Worsening  Chronicity:  New  Context: head movement and standing up    Context: not when bending over, not with loss of consciousness and not with medication    Relieved by:  Nothing  Worsened by:   Movement and turning head  Ineffective treatments:  Being still  Associated symptoms: nausea    Associated symptoms: no chest pain, no diarrhea, no headaches, no palpitations, no shortness of breath, no syncope, no tinnitus, no vision changes, no vomiting and no weakness    Risk factors: no heart disease, no hx of stroke, no hx of vertigo, no multiple medications and no new medications        Prior to Admission Medications   Prescriptions Last Dose Informant Patient Reported? Taking?   acetaminophen (TYLENOL) 160 mg/5 mL liquid   No No   Sig: Take 10 mL (320 mg total) by mouth every 4 (four) hours as needed for fever   ibuprofen (MOTRIN) 100 mg/5 mL suspension   No No   Sig: Take 20 mL (400 mg total) by mouth every 6 (six) hours as needed for mild pain   levothyroxine 100 mcg tablet 3/21/2023  Yes Yes   Sig: Take 100 mcg by mouth daily Takes brand name only- Synthroid      Facility-Administered Medications: None       Past Medical History:   Diagnosis Date   • Asthma    • Colitis    • Hypothyroid    • IBS (irritable bowel syndrome)        Past Surgical History:   Procedure Laterality Date   • APPENDECTOMY     • HYSTERECTOMY     • IN LAPAROSCOPIC APPENDECTOMY N/A 8/17/2018    Procedure: APPENDECTOMY LAPAROSCOPIC;  Surgeon: Tigre Garcia MD;  Location: [facility];  Service: General   • TONSILLECTOMY         History reviewed  No pertinent family history  I have reviewed and agree with the history as documented  E-Cigarette/Vaping   • E-Cigarette Use Never User      E-Cigarette/Vaping Substances   • Nicotine No    • THC No    • CBD No    • Flavoring No      Social History     Tobacco Use   • Smoking status: Every Day     Packs/day: 1 00     Years: 42 00     Pack years: 42 00     Types: Cigarettes   • Smokeless tobacco: Never   Vaping Use   • Vaping Use: Never used   Substance Use Topics   • Alcohol use: No   • Drug use: No       Review of Systems   Constitutional: Negative  Negative for chills, fatigue and fever  HENT: Negative  Negative for congestion, rhinorrhea, sore throat and tinnitus  Eyes: Negative  Negative for visual disturbance  Respiratory: Negative  Negative for cough, shortness of breath and wheezing  Cardiovascular: Negative    Negative for chest pain, palpitations, leg swelling and syncope  Gastrointestinal: Positive for nausea  Negative for abdominal pain, constipation, diarrhea and vomiting  Genitourinary: Negative  Negative for dysuria, flank pain, frequency and hematuria  Musculoskeletal: Negative  Negative for back pain, myalgias and neck pain  Skin: Negative  Negative for rash  Neurological: Positive for dizziness and light-headedness  Negative for syncope, facial asymmetry, speech difficulty, weakness, numbness and headaches  Psychiatric/Behavioral: Negative  Negative for confusion  All other systems reviewed and are negative  Physical Exam  Physical Exam  Vitals and nursing note reviewed  Constitutional:       General: She is awake  She is in acute distress  Appearance: She is well-developed  She is not toxic-appearing  HENT:      Head: Normocephalic and atraumatic  Right Ear: Hearing and external ear normal       Left Ear: Hearing and external ear normal       Nose: Nose normal       Mouth/Throat:      Mouth: Mucous membranes are moist       Tongue: Tongue does not deviate from midline  Pharynx: Oropharynx is clear  Uvula midline  Eyes:      General: Lids are normal  Gaze aligned appropriately  No visual field deficit or scleral icterus  Extraocular Movements: Extraocular movements intact  Conjunctiva/sclera: Conjunctivae normal       Pupils: Pupils are equal, round, and reactive to light  Neck:      Trachea: Trachea and phonation normal  No tracheal deviation  Cardiovascular:      Rate and Rhythm: Normal rate and regular rhythm  Pulses: Normal pulses  Radial pulses are 2+ on the right side and 2+ on the left side  Dorsalis pedis pulses are 2+ on the right side and 2+ on the left side  Posterior tibial pulses are 2+ on the right side and 2+ on the left side  Heart sounds: Normal heart sounds, S1 normal and S2 normal  No murmur heard    Pulmonary: Effort: Pulmonary effort is normal  No tachypnea or respiratory distress  Breath sounds: Normal breath sounds  No wheezing, rhonchi or rales  Abdominal:      General: Bowel sounds are normal  There is no distension  Palpations: Abdomen is soft  Tenderness: There is no abdominal tenderness  There is no guarding or rebound  Musculoskeletal:      Cervical back: Normal range of motion and neck supple  Right lower leg: No edema  Left lower leg: No edema  Skin:     General: Skin is warm and dry  Capillary Refill: Capillary refill takes less than 2 seconds  Findings: No rash  Neurological:      General: No focal deficit present  Mental Status: She is alert and oriented to person, place, and time  GCS: GCS eye subscore is 4  GCS verbal subscore is 5  GCS motor subscore is 6  Cranial Nerves: Cranial nerves 2-12 are intact  No cranial nerve deficit, dysarthria or facial asymmetry  Sensory: Sensation is intact  No sensory deficit  Motor: Motor function is intact  No weakness or abnormal muscle tone  Comments: A/O x4  PERRLA; EOMI  Facial expressions symmetric and sensation intact to light touch in v1-v3 bilaterally  Tongue/uvula midline  Shoulder shrug equal bilaterally  Speech clear with no dysarthria  Strength 5/5 in UE/LE bilaterally; sensation intact in UE/LE bilaterally  No dysmetria in any extremity  Psychiatric:         Mood and Affect: Affect is angry           Speech: Speech normal          Behavior: Behavior normal          Vital Signs  ED Triage Vitals [03/22/23 0928]   Temperature Pulse Respirations Blood Pressure SpO2   98 6 °F (37 °C) 79 16 154/80 96 %      Temp Source Heart Rate Source Patient Position - Orthostatic VS BP Location FiO2 (%)   Temporal Monitor Lying Left arm --      Pain Score       No Pain           Vitals:    03/22/23 0928 03/22/23 1230 03/22/23 1400   BP: 154/80 113/63 102/64   Pulse: 79 67 65   Patient Position - Orthostatic VS: Lying           Visual Acuity      ED Medications  Medications   ondansetron (ZOFRAN) injection 4 mg (4 mg Intravenous Given 3/22/23 1001)   sodium chloride 0 9 % bolus 1,000 mL (0 mL Intravenous Stopped 3/22/23 1100)   meclizine (ANTIVERT) tablet 25 mg (25 mg Oral Given 3/22/23 1007)   LORazepam (ATIVAN) injection 0 5 mg (0 5 mg Intravenous Given 3/22/23 1232)       Diagnostic Studies  Results Reviewed     Procedure Component Value Units Date/Time    T4, free [[id number]]  (Normal) Collected: 03/22/23 0948    Lab Status: Final result Specimen: Blood Updated: 03/22/23 2011     Free T4 1 12 ng/dL     UA w Reflex to Microscopic w Reflex to Culture [[id number]] Collected: 03/22/23 1244    Lab Status: Final result Specimen: Urine, Clean Catch Updated: 03/22/23 1300     Color, UA Light Yellow     Clarity, UA Clear     Specific Gravity, UA <=1 005     pH, UA 6 5     Leukocytes, UA Negative     Nitrite, UA Negative     Protein, UA Negative mg/dl      Glucose, UA Negative mg/dl      Ketones, UA Negative mg/dl      Urobilinogen, UA 0 2 E U /dl      Bilirubin, UA Negative     Occult Blood, UA Negative    TSH, 3rd generation with Free T4 reflex [[id number]]  (Abnormal) Collected: 03/22/23 0948    Lab Status: Final result Specimen: Blood Updated: 03/22/23 1157     TSH 3RD GENERATON 8 318 uIU/mL     Comprehensive metabolic panel [[id number]] Collected: 03/22/23 0948    Lab Status: Final result Specimen: Blood from Arm, Right Updated: 03/22/23 1042     Sodium 139 mmol/L      Potassium 4 1 mmol/L      Chloride 107 mmol/L      CO2 27 mmol/L      ANION GAP 5 mmol/L      BUN 13 mg/dL      Creatinine 0 89 mg/dL      Glucose 99 mg/dL      Calcium 9 5 mg/dL      AST 13 U/L      ALT 9 U/L      Alkaline Phosphatase 69 U/L      Total Protein 6 4 g/dL      Albumin 4 1 g/dL      Total Bilirubin 0 37 mg/dL      eGFR 71 ml/min/1 73sq m     Narrative:      [place] guidelines for Chronic Kidney Disease (CKD):    •  Stage 1 with normal or high GFR (GFR > 90 mL/min/1 73 square meters)  •  Stage 2 Mild CKD (GFR = 60-89 mL/min/1 73 square meters)  •  Stage 3A Moderate CKD (GFR = 45-59 mL/min/1 73 square meters)  •  Stage 3B Moderate CKD (GFR = 30-44 mL/min/1 73 square meters)  •  Stage 4 Severe CKD (GFR = 15-29 mL/min/1 73 square meters)  •  Stage 5 End Stage CKD (GFR <15 mL/min/1 73 square meters)  Note: GFR calculation is accurate only with a steady state creatinine    Magnesium [[id number]]  (Normal) Collected: 03/22/23 0948    Lab Status: Final result Specimen: Blood from Arm, Right Updated: 03/22/23 1042     Magnesium 2 1 mg/dL     HS Troponin 0hr (reflex protocol) [[id number]]  (Normal) Collected: 03/22/23 0948    Lab Status: Final result Specimen: Blood from Arm, Right Updated: 03/22/23 1042     hs TnI 0hr <2 ng/L     Protime-INR [[id number]]  (Normal) Collected: 03/22/23 0948    Lab Status: Final result Specimen: Blood from Arm, Right Updated: 03/22/23 1032     Protime 12 6 seconds      INR 0 93    APTT [[id number]]  (Normal) Collected: 03/22/23 0948    Lab Status: Final result Specimen: Blood from Arm, Right Updated: 03/22/23 1032     PTT 28 seconds     CBC and differential [[id number]] Collected: 03/22/23 0948    Lab Status: Final result Specimen: Blood from Arm, Right Updated: 03/22/23 1022     WBC 8 94 Thousand/uL      RBC 4 47 Million/uL      Hemoglobin 14 1 g/dL      Hematocrit 42 5 %      MCV 95 fL      MCH 31 5 pg      MCHC 33 2 g/dL      RDW 13 8 %      MPV 10 0 fL      Platelets 328 Thousands/uL      nRBC 0 /100 WBCs      Neutrophils Relative 54 %      Immat GRANS % 0 %      Lymphocytes Relative 37 %      Monocytes Relative 8 %      Eosinophils Relative 1 %      Basophils Relative 0 %      Neutrophils Absolute 4 77 Thousands/µL      Immature Grans Absolute 0 02 Thousand/uL      Lymphocytes Absolute 3 29 Thousands/µL      Monocytes Absolute 0 72 Thousand/µL      Eosinophils Absolute 0 10 Thousand/µL Basophils Absolute 0 04 Thousands/µL                  CT head without contrast   Final Result by Kelin Martinez DO (03/22 1052)      No acute intracranial abnormality  Chronic right maxillary sinus disease, similar  Workstation performed: [id number]                    Procedures  ECG 12 Lead Documentation Only    Date/Time: 3/22/2023 9:35 AM  Performed by: Kiara Hand PA-C  Authorized by: Kiara Hand PA-C     Indications / Diagnosis:  75  ECG reviewed by me, the ED Provider: yes    Patient location:  ED  Previous ECG:     Previous ECG:  Compared to current    Comparison ECG info:  1/24/19    Similarity:  No change    Comparison to cardiac monitor: Yes    Rate:     ECG rate:  75    ECG rate assessment: normal    Rhythm:     Rhythm: sinus rhythm    Ectopy:     Ectopy: none    QRS:     QRS axis:  Normal    QRS intervals:  Normal  Conduction:     Conduction: normal    ST segments:     ST segments:  Normal  T waves:     T waves: normal    Comments:      , QRS 76, QT/QTc 398/444; no acute ischemic changes  ED Course  ED Course as of 03/22/23 2154   Wed Mar 22, 2023   5234 Prior records reviewed to include office visits from 6/24/21 and 4/26/22  CBC, CMP performed last year 4/2022 were unremarkable  Also had CT abd/pelv which was reviewed  0930 Pt initially hesitant about further testing  She tells me she doesn't like take medications, states she can't swallow pills, concerned about potential side effects of the medications  Declined IV benzo due to concerns of side effects    Pt okay to proceed with CT head however does not want this done with contrast    1041 WBC: 8 94   1041 Hemoglobin: 14 1   1041 Platelet Count: 566   1041 POCT INR: 0 93   1041 PROTIME: 12 6   1041 PTT: 28   1045 Glucose, Random: 99   1045 Creatinine: 0 89   1045 BUN: 13   1045 Sodium: 139   1045 Potassium: 4 1   1045 Chloride: 107   1045 CO2: 27   1045 Anion Gap: 5   1045 Calcium: 9 5 1045 AST: 13   1045 ALT: 9   1045 Alkaline Phosphatase: 69   1045 Total Protein: 6 4   1045 Albumin: 4 1   1045 TOTAL BILIRUBIN: 0 37   1045 eGFR: 71   1045 Magnesium: 2 1   1045 hs TnI 0hr: <2  Not c/w ACS   1055 CT head without contrast  IMPRESSION:     No acute intracranial abnormality      Chronic right maxillary sinus disease, similar  1133 Pt updated, reassessed and updated on results thus far  She notes symptoms unchanged  She is tearful  Offered admission for intractable vertigo, pt does not want to stay as she states they are moving in 3 days  After discussion, pt willing to try a dose of benzo but will only take ativan and wants lowest dose  Will plan to reassess  1205 TSH 3RD Jordyn Rios(!): 8 318  History of hypothryoidism, on synthroid   1237 Pt ambulated to bathroom with assistance of RN, had to hold on to her and also used wall to brace herself while walking  1308 UA w Reflex to Microscopic w Reflex to Culture  Unremarkable; not suggestive of infection  1320 Pt awoken from sleep  She states she is tired  I asked how she was feeling and she responded that she hasn't moved yet so she doesn't know  She would like to rest for a bit  Will plan to reassess in a bit  I advised pt to ring call bell when she would like to trial ambulation again  At this time she is leaning towards request for discharge home and is requesting amoxicillin susp for her sinuses and okay to trial zofran, meclizine and referral to vestibular therapy  Again offered admission which she is declining at present  1423 Pt again reassessed  She was sleeping and states she feels fine as long as she doesn't move  She is afraid to sit up as she is fearful symptoms are going to return  Again offered admission in which she is hesitant as she would prefer to go home  She is going to trial ambulation and states she has to go pee  She was able to ambulate to bathroom unassisted    She states she feels improved from earlier and feels she can move more without symptoms like earlier  After she returned from the bathroom, she states she wants to go home and doesn't want to stay  Anticipatory guidance provided  Advised no driving while symptomatic  Discussed continued symptomatic/supportive care  Advised rest, fluids, OTC meds as needed for symptoms  Strict return precautions outlined  Advised outpatient follow up with PCP for recheck or return to ER for change in condition as outlined  Pt verbalized understanding and had no further questions  HEART Risk Score    Flowsheet Row Most Recent Value   Heart Score Risk Calculator    History 0 Filed at: 03/22/2023 0954   ECG 0 Filed at: 03/22/2023 9111   Age 1 Filed at: 03/22/2023 5488   Risk Factors 1 Filed at: 03/22/2023 0954   Troponin 0 Filed at: 03/22/2023 5604   HEART Score 2 Filed at: 03/22/2023 1072                                      Medical Decision Making  61year old female presenting for evaluation of dizziness which she describes as spinning, likely peripheral vertigo  Will obtain EKG and labs to evaluate for ACS, anemia, electrolyte abnormality, dehydration, thyroid status (has been on synthroid but no recent labs)  UA to evaluate for UTI  Will obtain CT head to ensure no underlying pathology  Will treat symptomatically with zofran, meclizine and fluids and plan to reassess  Dizziness: acute illness or injury     Details: Pt is describing vertigo; this appears positional in nature  Likely BPPV  reviewed home epley maneuver; will refer to vestibular therapy  Rx zofran, meclizine  EKG and troponin not c/w ACS  Elevated TSH: acute illness or injury     Details: In setting on hypothyroidism, on synthroid  Mildly elevated TSH but free T4 within normal limits  Recommended recheck with PCP  Right maxillary sinusitis: acute illness or injury     Details: Noted on CT scan  Not likely etiology of pt's symptoms, but will cover with Abx therapy  Pt requests amoxicillin suspension  Vertigo: acute illness or injury     Details: As noted above  She did have improvement of her symptoms but did not have complete resolution  She felt improved and wanted to go home  She was offered admission for her symptoms but declined  Amount and/or Complexity of Data Reviewed  External Data Reviewed: labs, radiology, ECG and notes  Details: PDMP was queried  Labs: ordered  Decision-making details documented in ED Course  Radiology: ordered  Decision-making details documented in ED Course  ECG/medicine tests: ordered and independent interpretation performed  Decision-making details documented in ED Course  Risk  OTC drugs  Prescription drug management  Parenteral controlled substances  Decision regarding hospitalization  Disposition  Final diagnoses:   Dizziness   Vertigo   Elevated TSH   Right maxillary sinusitis     Time reflects when diagnosis was documented in both MDM as applicable and the Disposition within this note     Time User Action Codes Description Comment    3/22/2023  2:48 PM Ellene Blizzard Add [R42] Dizziness     3/22/2023  2:48 PM Ellene Blizzard Add [R42] Vertigo     3/22/2023  2:48 PM Ellene Blizzard Add [R79 89] Elevated TSH     3/22/2023  2:49 PM Ellene Blizzard Add [J32 0] Right maxillary sinusitis       ED Disposition     ED Disposition   Discharge    Condition   Stable    Date/Time   Wed Mar 22, 2023  2:48 PM    Comment   [place] Gallego discharge to home/self care                 Follow-up Information     Follow up With Specialties Details Why Contact Info Additional Information    Gabriela Rosales MD Internal Medicine Schedule an appointment as soon as possible for a visit   [address] [place] [phone]       [facility] Emergency Department Emergency Medicine  As needed [address] Emergency Department, [address], [address]          Discharge Medication List as of 3/22/2023  2:58 PM      START taking these medications    Details   amoxicillin (AMOXIL) 400 MG/5ML suspension Take 6 3 mL (500 mg total) by mouth 3 (three) times a day for 7 days, Starting Wed 3/22/2023, Until Wed 3/29/2023, Normal      LORazepam (Ativan) 0 5 mg tablet Take 1 tablet (0 5 mg total) by mouth every 8 (eight) hours as needed for anxiety, Starting Wed 3/22/2023, Normal      meclizine (ANTIVERT) 25 mg tablet Take 1 tablet (25 mg total) by mouth 3 (three) times a day as needed for dizziness, Starting Wed 3/22/2023, Normal      ondansetron (ZOFRAN-ODT) 4 mg disintegrating tablet Take 1 tablet (4 mg total) by mouth every 6 (six) hours as needed for vomiting or nausea, Starting Wed 3/22/2023, Normal         CONTINUE these medications which have NOT CHANGED    Details   levothyroxine 100 mcg tablet Take 100 mcg by mouth daily Takes brand name only- Synthroid, Historical Med      acetaminophen (TYLENOL) 160 mg/5 mL liquid Take 10 mL (320 mg total) by mouth every 4 (four) hours as needed for fever, Starting u 12/29/2022, Normal      ibuprofen (MOTRIN) 100 mg/5 mL suspension Take 20 mL (400 mg total) by mouth every 6 (six) hours as needed for mild pain, Starting u 12/29/2022, Normal                 PDMP Review     None          ED Provider  Electronically Signed by           Edel Mcfarland PA-C  03/22/23 7497

## 2023-03-23 ENCOUNTER — EVALUATION (OUTPATIENT)
Dept: PHYSICAL THERAPY | Facility: CLINIC | Age: 58
End: 2023-03-23

## 2023-03-23 DIAGNOSIS — R42 VERTIGO: ICD-10-CM

## 2023-03-23 DIAGNOSIS — R42 DIZZINESS: ICD-10-CM

## 2023-03-23 NOTE — PROGRESS NOTES
PT Evaluation     Today's date: 3/23/2023  Patient name: Serg Swift  : 1965  MRN: [id number]  Referring provider: Art Wynn  Dx:   Encounter Diagnosis     ICD-10-CM    1  Dizziness  R42 Ambulatory Referral to Physical Therapy     PT plan of care cert/re-cert      2  Vertigo  R42 Ambulatory Referral to Physical Therapy     PT plan of care cert/re-cert          Start Time: 1500  Stop Time: 1600  Total time in clinic (min): 60 minutes    Assessment  Assessment details: The patient is a 62 yo female who presents to physical therapy with signs and symptoms consistent with BPPV   Century-Hallpike is (+) on the right  R Epley performed  Symptoms remained present upon retest but with less severity and shorter duration  A second Epley was performed  Good tolerance to canalith repositioning maneuver  The patient is instructed to contact the PT clinic tomorrow if symptoms persist   She may require further PT assessment if vertigo continues  Other impairment: Vertigo  Understanding of Dx/Px/POC: good   Prognosis: good    Goals  STG (1 week)   1  Patient will reports no episodes of vertigo for 1 week   2  Patient will resume all daily activities with no vertigo    LTG (3 weeks)   1  Patient will remain vertigo free   2  Return to PLOF   3  Patient will be independent with HEP     Plan  Patient would benefit from: skilled physical therapy  Planned therapy interventions: neuromuscular re-education, balance, canalith repositioning and home exercise program  Frequency: 2x week  Duration in weeks: 3  Plan of Care beginning date: 3/23/2023  Plan of Care expiration date: 2023  Treatment plan discussed with: patient        Subjective Evaluation    History of Present Illness  Mechanism of injury: 3/7/23 Patient was sleeping on the sofa  She tried to stand up and fell backwards  The following day, she had difficulty walking and felt very dizzy  Symptoms with supine to/from sit or turning head  Yesterday, the sxs were constant  She was seen in the ER  Testing was done  Rxd meclizine, zoloft and ativan  Pain  No pain reported    Social Support  Stairs in house: yes   Lives in: multiple-level home  Lives with: spouse    Employment status: working (Teacher)  Patient Goals  Patient goal: Stop vertigo        Objective     Concurrent Complaints  Positive for nausea/motion sickness  Negative for headaches, tinnitus, visual change, hearing loss, memory loss, poor concentration and peripheral neuropathy  Neuro Exam:     Dizziness  Positive for disequilibrium, vertigo, motion sickness, light-headedness and rocking or swaying  Negative for oscillopsia, diplopia and floating or swimming  Exacerbating factors  Positive for rolling in bed, looking up, turning head and supine to/from sitting  Bending over: pt avoids       Headaches   Patient reports headaches: No      Oculomotor exam   Oculomotor ROM: WNL  Resting nystagmus: not present   Gaze holding nystagmus: not present left  and not present right  Smooth pursuits: within normal limits  Vertical saccades: normal  Horizontal saccades: normal  Convergence: normal    Positional testing   Cyndie-Hallpike   Right posterior canal: symptomatic  Positional testing comment: Significant sway present with EC but no LOB      Balance assessments   MCTSIB   Eyes open level surface: 30 seconds  Eyes closed level surface: 30 seconds             Precautions: N/A  Diagnosis, prognosis and PT POC discussed with patient       Manuals 3/23                                                                Neuro Re-Ed             Canalith repositioning R Epley x2                                                                                          Ther Ex                                                                                                                     Ther Activity                                       Gait Training                                       Modalities

## 2023-03-24 ENCOUNTER — OFFICE VISIT (OUTPATIENT)
Dept: PHYSICAL THERAPY | Facility: CLINIC | Age: 58
End: 2023-03-24

## 2023-03-24 DIAGNOSIS — R42 VERTIGO: ICD-10-CM

## 2023-03-24 DIAGNOSIS — R42 DIZZINESS: Primary | ICD-10-CM

## 2023-03-24 NOTE — PROGRESS NOTES
Daily Note     Today's date: 3/24/2023  Patient name: Nanette Khan  : 1965  MRN: [id number]  Referring provider: Susan Bobo  Dx:   Encounter Diagnosis     ICD-10-CM    1  Dizziness  R42       2  Vertigo  R42           Start Time: 1000  Stop Time: 1020  Total time in clinic (min): 20 minutes    Subjective: The patient states that her symptoms are less intense today but remain present  Objective: See treatment diary below      Assessment: (+) Cyndie-Hallpike on L today  L Epley performed  Patient experienced significant dizziness after maneuver and had to lay in L SL for several minutes to recover  Retesting (-)  Patient directed to avoid excessive forward bending today and to contact clinic if symptoms persist   Tolerated treatment fair  Plan: Continue per plan of care  Reassess patient prn and treat accordingly        Precautions: N/A  Diagnosis, prognosis and PT POC discussed with patient       Manuals 3/23 3/24                                                               Neuro Re-Ed             Canalith repositioning R Epley x2 L Epley x1                                                                                         Ther Ex                                                                                                                     Ther Activity                                       Gait Training                                       Modalities

## 2023-03-30 ENCOUNTER — EVALUATION (OUTPATIENT)
Dept: PHYSICAL THERAPY | Facility: CLINIC | Age: 58
End: 2023-03-30

## 2023-03-30 DIAGNOSIS — R42 DIZZINESS: Primary | ICD-10-CM

## 2023-03-30 DIAGNOSIS — R42 VERTIGO: ICD-10-CM

## 2023-03-30 NOTE — PROGRESS NOTES
PT Evaluation     Today's date: 3/30/2023  Patient name: Tenzin Ruth  : 1965  MRN: [id number]  Referring provider: Conrado Colon  Dx:   Encounter Diagnosis     ICD-10-CM    1  Dizziness  R42       2  Vertigo  R42                      Assessment  Assessment details: The patient is a 60 yo female who presents to physical therapy with signs and symptoms consistent with BPPV   Berino-Hallpike is (+) on the right  R Epley performed  Symptoms remained present upon retest but with less severity and shorter duration  A second Epley was performed  Good tolerance to canalith repositioning maneuver  The patient is instructed to contact the PT clinic tomorrow if symptoms persist   She may require further PT assessment if vertigo continues  PT re-evaluation 3/30/23:  I agree with prior PT diagnosis of BPPV  + for sxs on L today and performed epley x3 with reduction in sxs post but still present  Will assess R tomorrow and provide self epley for the weekend  Other impairment: Vertigo  Understanding of Dx/Px/POC: good   Prognosis: good    Goals  STG (1 week)   1  Patient will reports no episodes of vertigo for 1 week   2  Patient will resume all daily activities with no vertigo    LTG (3 weeks)   1  Patient will remain vertigo free   2  Return to PLOF   3  Patient will be independent with HEP     Plan  Patient would benefit from: skilled physical therapy  Planned therapy interventions: neuromuscular re-education, balance, canalith repositioning and home exercise program  Frequency: 2x week  Duration in weeks: 3  Plan of Care beginning date: 3/23/2023  Plan of Care expiration date: 2023  Treatment plan discussed with: patient        Subjective Evaluation    History of Present Illness  Mechanism of injury: 3/7/23 Patient was sleeping on the sofa  She tried to stand up and fell backwards  The following day, she had difficulty walking and felt very dizzy   Symptoms with supine to/from sit or turning head  Yesterday, the sxs were constant  She was seen in the ER  Testing was done  Rxd meclizine, zoloft and ativan  3/30/23 PT re-eval for location transfer:    Pt notes worst room spinning dizziness with bending forward, laying down in bed and getting up from bed  She denies any falls since beginning of episode  cyndie hallpike was performed b/l at prior PT and she notes much more severe dizziness on the L  She did have decreased sxs after first epley but then returned  Pain  No pain reported    Social Support  Stairs in house: yes   Lives in: multiple-level home  Lives with: spouse    Employment status: working (Teacher)  Patient Goals  Patient goal: Stop vertigo        Objective     Concurrent Complaints  Positive for nausea/motion sickness  Negative for headaches, tinnitus, visual change, hearing loss, memory loss, poor concentration and peripheral neuropathy  Neuro Exam:     Dizziness  Positive for disequilibrium, vertigo, motion sickness, light-headedness and rocking or swaying  Negative for oscillopsia, diplopia and floating or swimming  Exacerbating factors  Positive for rolling in bed, looking up, turning head and supine to/from sitting  Bending over: pt avoids       Headaches   Patient reports headaches: No      Oculomotor exam   Oculomotor ROM: WNL  Resting nystagmus: not present   Gaze holding nystagmus: not present left  and not present right  Smooth pursuits: within normal limits  Vertical saccades: normal  Horizontal saccades: normal  Convergence: normal    Positional testing   Cyndie-Hallpike   Left posterior canal: symptomatic             Precautions: N/A  Diagnosis, prognosis and PT POC discussed with patient       Manuals 3/30                                                                Neuro Re-Ed             Canalith repositioning L Epley x3                                                                                          Ther Ex Ther Activity                                       Gait Training                                       Modalities

## 2023-03-31 ENCOUNTER — OFFICE VISIT (OUTPATIENT)
Dept: PHYSICAL THERAPY | Facility: CLINIC | Age: 58
End: 2023-03-31

## 2023-03-31 DIAGNOSIS — R42 DIZZINESS: Primary | ICD-10-CM

## 2023-03-31 DIAGNOSIS — R42 VERTIGO: ICD-10-CM

## 2023-03-31 NOTE — PROGRESS NOTES
Daily Note     Today's date: 3/31/2023  Patient name: Chano Medellin  : 1965  MRN: [id number]  Referring provider: Omi Castro  Dx:   Encounter Diagnosis     ICD-10-CM    1  Dizziness  R42       2  Vertigo  R42                      Subjective: Pt reports continued dizziness  Objective: See treatment diary below      Assessment: Tolerated treatment well  Patient would benefit from continued PT  Performed R epley today after symptomatic luc hallpike  Educated on self epley x3 Saturday and   Plan: Continue per plan of care        Precautions: N/A  Diagnosis, prognosis and PT POC discussed with patient       Manuals 3/30 3/31                                                               Neuro Re-Ed             Canalith repositioning L Epley x3 R epley x3                                                                                         Ther Ex                                                                                                                     Ther Activity                                       Gait Training                                       Modalities

## 2023-04-04 ENCOUNTER — OFFICE VISIT (OUTPATIENT)
Dept: PHYSICAL THERAPY | Facility: CLINIC | Age: 58
End: 2023-04-04

## 2023-04-04 DIAGNOSIS — R42 DIZZINESS: Primary | ICD-10-CM

## 2023-04-04 DIAGNOSIS — R42 VERTIGO: ICD-10-CM

## 2023-04-04 NOTE — PROGRESS NOTES
Daily Note     Today's date: 2023  Patient name: Queenie Meléndez  : 1965  MRN: [id number]  Referring provider: Radha Bernabe  Dx:   Encounter Diagnosis     ICD-10-CM    1  Dizziness  R42       2  Vertigo  R42                      Subjective: Pt notes reduced dizziness today but forgot how to do the Epley so did not perform over the weekend  Objective: See treatment diary below      Assessment: Tolerated treatment well  Patient would benefit from continued PT  Mild dizziness with R Epley but no nystagmus noted  Highly dizzy and unsteady with gait with head turns, not produced with isolated head turns  Also dizzy with end range R gaze during VOR testing  Educated on greater VOR exercises for home  Plan: Continue per plan of care        Precautions: N/A  Diagnosis, prognosis and PT POC discussed with patient       Manuals 3/30 3/31 4/4                                                              Neuro Re-Ed             Canalith repositioning L Epley x3 R epley x3 R epley x1                                                                                        Ther Ex             Lisa Kellie   1'x3          VORx1 with gait   1'x3          Gait with head turns   40ft highly dizzy          Midline to R side gaze    1'x3                                                              Ther Activity                                       Gait Training                                       Modalities

## 2023-04-06 ENCOUNTER — EVALUATION (OUTPATIENT)
Dept: PHYSICAL THERAPY | Facility: CLINIC | Age: 58
End: 2023-04-06

## 2023-04-06 DIAGNOSIS — R42 DIZZINESS: Primary | ICD-10-CM

## 2023-04-06 DIAGNOSIS — R42 VERTIGO: ICD-10-CM

## 2023-04-06 NOTE — PROGRESS NOTES
Re-evaluation     Today's date: 2023  Patient name: Dennis Alejandro  : 1965  MRN: [id number]  Referring provider: Josefina Covarrubias  Dx:   Encounter Diagnosis     ICD-10-CM    1  Dizziness  R42       2  Vertigo  R42                      Subjective: Pt reports 75% improvement in dizziness since her hospital visit and 40% improvement since starting PT  She notes lots of dizziness in open spaces still and driving  She does feel much improved in transitioning from from laying to sitting and walking in smaller spaces but does continue with some dizziness  Walking in stores while moving her head tends to make her dizzy as well  She has felt less symptomatic with luc hallpike as well  While sitting and working, she notes no dizziness at this point  Objective: See treatment diary below  Head shakes: increased dizziness, able to hold gaze  Eye tracking right gaze: mild dizziness--> improved from lv  Gait with head turns: CG level with moderate dizziness and high levels of cognitive fatigue  VORx2 mild dizziness--> improved from IE  R luc halpike: mild dizziness, no nystagmus (most likely orientation issue secondary to continued hypofunction)      Assessment: Tolerated treatment well  Patient would benefit from continued PT  Pt appears to have cleared BPPV at this point but continued signs of vestibular hypofunction  Overall sxs are improving well  She will benefit from further balance/coordination training  STG (1 week)   1  Patient will reports no episodes of vertigo for 1 week   2  Patient will resume all daily activities with no vertigo    LTG (3 weeks)   1  Patient will remain vertigo free   2  Return to PLOF   3  Patient will be independent with HEP       Plan: Continue per plan of care        Precautions: N/A  Diagnosis, prognosis and PT POC discussed with patient       Manuals 3/30 3/31 4/4 4/6                                                             Neuro Re-Ed             Carlos repositioning L Epley x3 R epley x3 R epley x1 R epley x1         Figure 8    10x10:                                                                           Ther Ex             Maretta Gent   1'x3 CB         VORx1 with gait   1'x3 CB         Gait with head turns   40ft highly dizzy 40ftx6 mod dizzy         Midline to R side gaze    1'x3 3' foam                                                             Ther Activity                                       Gait Training                                       Modalities

## 2023-04-13 ENCOUNTER — APPOINTMENT (OUTPATIENT)
Dept: PHYSICAL THERAPY | Facility: CLINIC | Age: 58
End: 2023-04-13

## 2023-04-21 ENCOUNTER — APPOINTMENT (OUTPATIENT)
Dept: PHYSICAL THERAPY | Facility: CLINIC | Age: 58
End: 2023-04-21

## 2023-04-26 ENCOUNTER — OFFICE VISIT (OUTPATIENT)
Dept: PHYSICAL THERAPY | Facility: CLINIC | Age: 58
End: 2023-04-26

## 2023-04-26 DIAGNOSIS — R42 VERTIGO: ICD-10-CM

## 2023-04-26 DIAGNOSIS — R42 DIZZINESS: Primary | ICD-10-CM

## 2023-04-26 NOTE — PROGRESS NOTES
Daily Note     Today's date: 2023  Patient name: Meera Fernandez  : 1965  MRN: [id number]  Referring provider: David Oropeza  Dx:   Encounter Diagnosis     ICD-10-CM    1  Dizziness  R42       2  Vertigo  R42                      Subjective: Pt has been doing better with dizziness walking through stores  She was at an outdoor [address] that was more crowded over the weekend and this caused increased sxs  Objective: See treatment diary below      Assessment: Tolerated treatment well  Patient would benefit from continued PT  Focused on switching gaze from close to far quickly with increased challenge  Educated on attempting to walk through more crowded areas over the next week  Plan: Continue per plan of care        Precautions: N/A  Diagnosis, prognosis and PT POC discussed with patient     Manuals 3/30 3/31 4/4 4/6 4/12 4/18 4/26                                                          Neuro Re-Ed             Canalith repositioning L Epley x3 R epley x3 R epley x1 R epley x1         Figure 8    10x10: 3'        Ball toss letters     4x60ft        Up down gaze 360s      40ftx2                                              Ther Ex             Elinore Dylon   1'x3 CB         VORx1 with gait   1'x3 CB  lunge to bosu 5'       Gait with head turns   40ft highly dizzy 40ftx6 mod dizzy 40ftx6 mild dizzy; 120ft w/# call outs CB CB; fd and back      Midline to R side gaze    1'x3 3' foam         Tandem balance foam     number callout 4' CB 5' w/far/close gaze      biodex       PS w/EC 4'                                Ther Activity                                       Gait Training                                       Modalities

## 2023-05-03 ENCOUNTER — APPOINTMENT (OUTPATIENT)
Dept: PHYSICAL THERAPY | Facility: CLINIC | Age: 58
End: 2023-05-03
Payer: COMMERCIAL

## 2023-05-10 ENCOUNTER — OFFICE VISIT (OUTPATIENT)
Dept: PHYSICAL THERAPY | Facility: CLINIC | Age: 58
End: 2023-05-10

## 2023-05-10 DIAGNOSIS — R42 DIZZINESS: Primary | ICD-10-CM

## 2023-05-10 DIAGNOSIS — R42 VERTIGO: ICD-10-CM

## 2023-05-10 NOTE — PROGRESS NOTES
Daily Note     Today's date: 5/10/2023  Patient name: Antonio Manzano  : 1965  MRN: [id number]  Referring provider: Rodolfo Matthew  Dx:   Encounter Diagnosis     ICD-10-CM    1  Dizziness  R42       2  Vertigo  R42                      Subjective: Pt states she is feeling 99% improved in her dizziness today and confident to make today her final visit  Objective: See treatment diary below      Assessment: Tolerated treatment well  Patient exhibited good technique with therapeutic exercises  Able to perform all exercises without signifcant cognitive fatigue today  She has returned to full daily activity  Patient is appropriate to move to [facility] only at this point and understands they may call in with any future questions/concerns          Plan: DC     Precautions: N/A  Diagnosis, prognosis and PT POC discussed with patient     Manuals 3/30 3/31 4/4 4/6 4/12 4/18 4/26 5/10                                                         Neuro Re-Ed             Canalith repositioning L Epley x3 R epley x3 R epley x1 R epley x1         Figure 8    10x10: 3'   CB     Ball toss letters     4x60ft        Up down gaze 360s      40ftx2  CB                                            Ther Ex             Lito Pop   1'x3 CB         VORx1 with gait   1'x3 CB  lunge to bosu 5'       Gait with head turns   40ft highly dizzy 40ftx6 mod dizzy 40ftx6 mild dizzy; 120ft w/# call outs CB CB; fd and back CB     Midline to R side gaze    1'x3 3' foam         Tandem balance foam     number callout 4' CB 5' w/far/close gaze CB     biodex       PS w/EC 4'                                Ther Activity                                       Gait Training                                       Modalities

## 2023-09-11 ENCOUNTER — HOSPITAL ENCOUNTER (EMERGENCY)
Facility: HOSPITAL | Age: 58
Discharge: HOME | End: 2023-09-11
Attending: EMERGENCY MEDICINE | Admitting: EMERGENCY MEDICINE
Payer: COMMERCIAL

## 2023-09-11 ENCOUNTER — APPOINTMENT (EMERGENCY)
Dept: RADIOLOGY | Facility: HOSPITAL | Age: 58
End: 2023-09-11
Payer: COMMERCIAL

## 2023-09-11 VITALS
TEMPERATURE: 98 F | HEART RATE: 72 BPM | OXYGEN SATURATION: 98 % | DIASTOLIC BLOOD PRESSURE: 70 MMHG | SYSTOLIC BLOOD PRESSURE: 147 MMHG | BODY MASS INDEX: 27.69 KG/M2 | RESPIRATION RATE: 16 BRPM | WEIGHT: 176.81 LBS

## 2023-09-11 DIAGNOSIS — K59.00 CONSTIPATION: Primary | ICD-10-CM

## 2023-09-11 LAB
ALBUMIN SERPL BCP-MCNC: 4.2 G/DL (ref 3.5–5)
ALP SERPL-CCNC: 64 U/L (ref 34–104)
ALT SERPL W P-5'-P-CCNC: 10 U/L (ref 7–52)
ANION GAP SERPL CALCULATED.3IONS-SCNC: 7 MMOL/L
AST SERPL W P-5'-P-CCNC: 17 U/L (ref 13–39)
ATRIAL RATE: 70 BPM
BASOPHILS # BLD AUTO: 0.04 THOUSANDS/ÂΜL (ref 0–0.1)
BASOPHILS NFR BLD AUTO: 0 % (ref 0–1)
BILIRUB SERPL-MCNC: 0.36 MG/DL (ref 0.2–1)
BUN SERPL-MCNC: 15 MG/DL (ref 5–25)
CALCIUM SERPL-MCNC: 9.7 MG/DL (ref 8.4–10.2)
CARDIAC TROPONIN I PNL SERPL HS: <2 NG/L
CHLORIDE SERPL-SCNC: 108 MMOL/L (ref 96–108)
CO2 SERPL-SCNC: 24 MMOL/L (ref 21–32)
CREAT SERPL-MCNC: 0.96 MG/DL (ref 0.6–1.3)
D DIMER PPP FEU-MCNC: 0.41 UG/ML FEU
EOSINOPHIL # BLD AUTO: 0.06 THOUSAND/ÂΜL (ref 0–0.61)
EOSINOPHIL NFR BLD AUTO: 1 % (ref 0–6)
ERYTHROCYTE [DISTWIDTH] IN BLOOD BY AUTOMATED COUNT: 13.5 % (ref 11.6–15.1)
GFR SERPL CREATININE-BSD FRML MDRD: 65 ML/MIN/1.73SQ M
GLUCOSE SERPL-MCNC: 83 MG/DL (ref 65–140)
HCT VFR BLD AUTO: 41.7 % (ref 34.8–46.1)
HGB BLD-MCNC: 13.3 G/DL (ref 11.5–15.4)
IMM GRANULOCYTES # BLD AUTO: 0.05 THOUSAND/UL (ref 0–0.2)
IMM GRANULOCYTES NFR BLD AUTO: 1 % (ref 0–2)
LIPASE SERPL-CCNC: 26 U/L (ref 11–82)
LYMPHOCYTES # BLD AUTO: 3.23 THOUSANDS/ÂΜL (ref 0.6–4.47)
LYMPHOCYTES NFR BLD AUTO: 34 % (ref 14–44)
MCH RBC QN AUTO: 30.8 PG (ref 26.8–34.3)
MCHC RBC AUTO-ENTMCNC: 31.9 G/DL (ref 31.4–37.4)
MCV RBC AUTO: 97 FL (ref 82–98)
MONOCYTES # BLD AUTO: 0.68 THOUSAND/ÂΜL (ref 0.17–1.22)
MONOCYTES NFR BLD AUTO: 7 % (ref 4–12)
NEUTROPHILS # BLD AUTO: 5.35 THOUSANDS/ÂΜL (ref 1.85–7.62)
NEUTS SEG NFR BLD AUTO: 57 % (ref 43–75)
NRBC BLD AUTO-RTO: 0 /100 WBCS
P AXIS: 64 DEGREES
PLATELET # BLD AUTO: 303 THOUSANDS/UL (ref 149–390)
PMV BLD AUTO: 9.6 FL (ref 8.9–12.7)
POTASSIUM SERPL-SCNC: 3.6 MMOL/L (ref 3.5–5.3)
PR INTERVAL: 146 MS
PROT SERPL-MCNC: 6.9 G/DL (ref 6.4–8.4)
QRS AXIS: 51 DEGREES
QRSD INTERVAL: 72 MS
QT INTERVAL: 378 MS
QTC INTERVAL: 408 MS
RBC # BLD AUTO: 4.32 MILLION/UL (ref 3.81–5.12)
SODIUM SERPL-SCNC: 139 MMOL/L (ref 135–147)
T WAVE AXIS: 53 DEGREES
TSH SERPL DL<=0.05 MIU/L-ACNC: 8.57 UIU/ML (ref 0.45–4.5)
VENTRICULAR RATE: 70 BPM
WBC # BLD AUTO: 9.41 THOUSAND/UL (ref 4.31–10.16)

## 2023-09-11 PROCEDURE — 36415 COLL VENOUS BLD VENIPUNCTURE: CPT | Performed by: EMERGENCY MEDICINE

## 2023-09-11 PROCEDURE — 84443 ASSAY THYROID STIM HORMONE: CPT | Performed by: EMERGENCY MEDICINE

## 2023-09-11 PROCEDURE — 96361 HYDRATE IV INFUSION ADD-ON: CPT

## 2023-09-11 PROCEDURE — 84484 ASSAY OF TROPONIN QUANT: CPT | Performed by: EMERGENCY MEDICINE

## 2023-09-11 PROCEDURE — 80053 COMPREHEN METABOLIC PANEL: CPT | Performed by: EMERGENCY MEDICINE

## 2023-09-11 PROCEDURE — 99285 EMERGENCY DEPT VISIT HI MDM: CPT | Performed by: EMERGENCY MEDICINE

## 2023-09-11 PROCEDURE — 74022 RADEX COMPL AQT ABD SERIES: CPT

## 2023-09-11 PROCEDURE — 85025 COMPLETE CBC W/AUTO DIFF WBC: CPT | Performed by: EMERGENCY MEDICINE

## 2023-09-11 PROCEDURE — 93010 ELECTROCARDIOGRAM REPORT: CPT | Performed by: STUDENT IN AN ORGANIZED HEALTH CARE EDUCATION/TRAINING PROGRAM

## 2023-09-11 PROCEDURE — 96374 THER/PROPH/DIAG INJ IV PUSH: CPT

## 2023-09-11 PROCEDURE — 85379 FIBRIN DEGRADATION QUANT: CPT | Performed by: EMERGENCY MEDICINE

## 2023-09-11 PROCEDURE — 83690 ASSAY OF LIPASE: CPT | Performed by: EMERGENCY MEDICINE

## 2023-09-11 PROCEDURE — 99285 EMERGENCY DEPT VISIT HI MDM: CPT

## 2023-09-11 PROCEDURE — 93005 ELECTROCARDIOGRAM TRACING: CPT

## 2023-09-11 RX ORDER — MAGNESIUM HYDROXIDE/ALUMINUM HYDROXICE/SIMETHICONE 120; 1200; 1200 MG/30ML; MG/30ML; MG/30ML
30 SUSPENSION ORAL ONCE
Status: COMPLETED | OUTPATIENT
Start: 2023-09-11 | End: 2023-09-11

## 2023-09-11 RX ORDER — POLYETHYLENE GLYCOL 3350 17 G/17G
17 POWDER, FOR SOLUTION ORAL DAILY
Qty: 24 EACH | Refills: 0 | Status: SHIPPED | OUTPATIENT
Start: 2023-09-11

## 2023-09-11 RX ORDER — LIDOCAINE HYDROCHLORIDE 20 MG/ML
15 SOLUTION OROPHARYNGEAL 4 TIMES DAILY PRN
Status: DISCONTINUED | OUTPATIENT
Start: 2023-09-11 | End: 2023-09-11 | Stop reason: HOSPADM

## 2023-09-11 RX ORDER — KETOROLAC TROMETHAMINE 30 MG/ML
30 INJECTION, SOLUTION INTRAMUSCULAR; INTRAVENOUS ONCE
Status: DISCONTINUED | OUTPATIENT
Start: 2023-09-11 | End: 2023-09-11 | Stop reason: HOSPADM

## 2023-09-11 RX ORDER — FAMOTIDINE 10 MG/ML
20 INJECTION, SOLUTION INTRAVENOUS DAILY
Status: DISCONTINUED | OUTPATIENT
Start: 2023-09-11 | End: 2023-09-11 | Stop reason: HOSPADM

## 2023-09-11 RX ADMIN — SODIUM CHLORIDE 1000 ML: 0.9 INJECTION, SOLUTION INTRAVENOUS at 15:56

## 2023-09-11 RX ADMIN — FAMOTIDINE 20 MG: 10 INJECTION INTRAVENOUS at 16:10

## 2023-09-11 RX ADMIN — ALUMINUM HYDROXIDE, MAGNESIUM HYDROXIDE, AND DIMETHICONE 30 ML: 200; 20; 200 SUSPENSION ORAL at 16:09

## 2023-09-11 NOTE — ED PROVIDER NOTES
Pt Name: Ace Taylor  MRN: [id number]  [address] 1965  Age/Sex: 61 y.o. female  Date of evaluation: 9/11/2023  PCP: Timothy Boo MD    [address]    Chief Complaint   Patient presents with   • Chest Pain     Pt reports L thoracic pain that began this past Thursday. Pain starts at L lateral chest and radiates to area under L breast.   • Dizziness     Pt reports feeling "vertigo coming back" and feeling exhausted over the past week. HPI    Girish Davis presents to the Emergency Department complaining of pain on the left side of her chest that wraps under her breast and around to her back. The pain has been constant but occasionally increases to a stabbing pain. This has been happening for the last several days. Santosh Madera HPI      Past Medical and Surgical History    Past Medical History:   Diagnosis Date   • Asthma    • Colitis    • Hypothyroid    • IBS (irritable bowel syndrome)        Past Surgical History:   Procedure Laterality Date   • APPENDECTOMY     • HYSTERECTOMY     • HI LAPAROSCOPIC APPENDECTOMY N/A 8/17/2018    Procedure: APPENDECTOMY LAPAROSCOPIC;  Surgeon: Debbie Michelle MD;  Location: [facility];  Service: General   • TONSILLECTOMY         History reviewed. No pertinent family history. Social History     Tobacco Use   • Smoking status: Every Day     Packs/day: 1.00     Years: 42.00     Total pack years: 42.00     Types: Cigarettes   • Smokeless tobacco: Never   Vaping Use   • Vaping Use: Never used   Substance Use Topics   • Alcohol use: No   • Drug use: No         .    Allergies    Allergies   Allergen Reactions   • Contrast Dye [Iodinated Contrast Media] Anaphylaxis     Felt like she was gasping for breathe   • Zithromax [Azithromycin] Other (See Comments)     EKG changes, QT prolongation       Home Medications    Prior to Admission medications    Medication Sig Start Date End Date Taking?  Authorizing Provider   acetaminophen (TYLENOL) 160 mg/5 mL liquid Take 10 mL (320 mg total) by mouth every 4 (four) hours as needed for fever 12/29/22   Yordy Walsh PA-C   ibuprofen (MOTRIN) 100 mg/5 mL suspension Take 20 mL (400 mg total) by mouth every 6 (six) hours as needed for mild pain 12/29/22   Yordy Walsh PA-C   levothyroxine 100 mcg tablet Take 100 mcg by mouth daily Takes brand name only- Synthroid    Historical Provider, MD   LORazepam (Ativan) 0.5 mg tablet Take 1 tablet (0.5 mg total) by mouth every 8 (eight) hours as needed for anxiety 3/22/23   Jeramie Calvin PA-C   meclizine (ANTIVERT) 25 mg tablet Take 1 tablet (25 mg total) by mouth 3 (three) times a day as needed for dizziness 3/22/23   Jeramie Calvin PA-C   ondansetron (ZOFRAN-ODT) 4 mg disintegrating tablet Take 1 tablet (4 mg total) by mouth every 6 (six) hours as needed for vomiting or nausea 3/22/23   Jeramie Calvin PA-C           Review of Systems    Review of Systems   Constitutional: Negative for activity change, appetite change, chills, diaphoresis, fatigue and fever. HENT: Negative for congestion, ear pain, postnasal drip, rhinorrhea, sinus pressure, sneezing and sore throat. Eyes: Negative for pain and visual disturbance. Respiratory: Positive for shortness of breath. Negative for cough and chest tightness. Cardiovascular: Positive for chest pain. Negative for palpitations and leg swelling. Gastrointestinal: Positive for abdominal pain and constipation. Negative for abdominal distention, diarrhea, nausea and vomiting. Endocrine: Negative for polydipsia, polyphagia and polyuria. Genitourinary: Negative for decreased urine volume, difficulty urinating, dysuria, flank pain, frequency and hematuria. Musculoskeletal: Negative for arthralgias, back pain, gait problem, joint swelling and neck pain. Skin: Negative for color change, pallor and rash. Allergic/Immunologic: Negative for immunocompromised state.    Neurological: Negative for seizures, syncope, speech difficulty, weakness, light-headedness, numbness and headaches. All other systems reviewed and are negative. Physical Exam      ED Triage Vitals   Temperature Pulse Respirations Blood Pressure SpO2   09/11/23 1438 09/11/23 1438 09/11/23 1438 09/11/23 1438 09/11/23 1438   98 °F (36.7 °C) 82 18 139/72 100 %      Temp Source Heart Rate Source Patient Position - Orthostatic VS BP Location FiO2 (%)   09/11/23 1438 09/11/23 1438 09/11/23 1438 09/11/23 1615 --   Oral Monitor Lying Left arm       Pain Score       09/11/23 1438       9               Physical Exam  Vitals and nursing note reviewed. Constitutional:       General: She is not in acute distress. Appearance: She is well-developed. HENT:      Head: Normocephalic and atraumatic. Eyes:      Conjunctiva/sclera: Conjunctivae normal.   Cardiovascular:      Rate and Rhythm: Normal rate and regular rhythm. Heart sounds: No murmur heard. Pulmonary:      Effort: Pulmonary effort is normal. No respiratory distress. Breath sounds: Normal breath sounds. Abdominal:      Palpations: Abdomen is soft. Tenderness: There is no abdominal tenderness. Musculoskeletal:         General: No swelling. Cervical back: Neck supple. Skin:     General: Skin is warm and dry. Capillary Refill: Capillary refill takes less than 2 seconds. Neurological:      Mental Status: She is alert. Psychiatric:         Mood and Affect: Mood normal.          Assessment and Plan    Leena Drake is a 61 y.o. female who presents with left sided pain. Physical examination unremarkable. Differential diagnosis (not completely inclusive) includes cardiopulmonary, intra-abdominal or musculoskeletal causes. Plan will be to perform diagnostic testing and treat symptomatically. MDM    Diagnostic Results      EKG INTERPRETATION  EKG Interpretation    Rate: 70 BPM  Rhythm: sinus  Axis: normal  ST segments: no ischemic changes are seen. Impression: nondiagnostic EKG.  EKG for comparison: reviewed  EKG interpreted by me. Interpretation by Maria De Jesus Hairston DO  EKG reviewed and interpreted independently.     Labs:    Results for orders placed or performed during the hospital encounter of 09/11/23   CBC and differential   Result Value Ref Range    WBC 9.41 4.31 - 10.16 Thousand/uL    RBC 4.32 3.81 - 5.12 Million/uL    Hemoglobin 13.3 11.5 - 15.4 g/dL    Hematocrit 41.7 34.8 - 46.1 %    MCV 97 82 - 98 fL    MCH 30.8 26.8 - 34.3 pg    MCHC 31.9 31.4 - 37.4 g/dL    RDW 13.5 11.6 - 15.1 %    MPV 9.6 8.9 - 12.7 fL    Platelets 899 293 - 594 Thousands/uL    nRBC 0 /100 WBCs    Neutrophils Relative 57 43 - 75 %    Immat GRANS % 1 0 - 2 %    Lymphocytes Relative 34 14 - 44 %    Monocytes Relative 7 4 - 12 %    Eosinophils Relative 1 0 - 6 %    Basophils Relative 0 0 - 1 %    Neutrophils Absolute 5.35 1.85 - 7.62 Thousands/µL    Immature Grans Absolute 0.05 0.00 - 0.20 Thousand/uL    Lymphocytes Absolute 3.23 0.60 - 4.47 Thousands/µL    Monocytes Absolute 0.68 0.17 - 1.22 Thousand/µL    Eosinophils Absolute 0.06 0.00 - 0.61 Thousand/µL    Basophils Absolute 0.04 0.00 - 0.10 Thousands/µL   Comprehensive metabolic panel   Result Value Ref Range    Sodium 139 135 - 147 mmol/L    Potassium 3.6 3.5 - 5.3 mmol/L    Chloride 108 96 - 108 mmol/L    CO2 24 21 - 32 mmol/L    ANION GAP 7 mmol/L    BUN 15 5 - 25 mg/dL    Creatinine 0.96 0.60 - 1.30 mg/dL    Glucose 83 65 - 140 mg/dL    Calcium 9.7 8.4 - 10.2 mg/dL    AST 17 13 - 39 U/L    ALT 10 7 - 52 U/L    Alkaline Phosphatase 64 34 - 104 U/L    Total Protein 6.9 6.4 - 8.4 g/dL    Albumin 4.2 3.5 - 5.0 g/dL    Total Bilirubin 0.36 0.20 - 1.00 mg/dL    eGFR 65 ml/min/1.73sq m   Lipase   Result Value Ref Range    Lipase 26 11 - 82 u/L   HS Troponin 0hr (reflex protocol)   Result Value Ref Range    hs TnI 0hr <2 "Refer to ACS Flowchart"- see link ng/L   D-dimer, quantitative   Result Value Ref Range    D-Dimer, Quant 0.41 <0.50 ug/ml FEU   TSH Result Value Ref Range    TSH 3RD GENERATON 8.574 (H) 0.450 - 4.500 uIU/mL   ECG 12 lead   Result Value Ref Range    Ventricular Rate 70 BPM    Atrial Rate 70 BPM    HI Interval 146 ms    QRSD Interval 72 ms    QT Interval 378 ms    QTC Interval 408 ms    P [place] 64 degrees    QRS Axis 51 degrees    T Wave Axis 53 degrees       All labs reviewed and utilized in the medical decision making process    Radiology:    XR abdomen obstruction series   Final Result      Nonobstructive bowel gas pattern. Workstation performed: [id number]             All radiology studies independently viewed by me and interpreted by the radiologist.    Procedure    Procedures      ED Course of Care and Re-Assessments    Patient was extremely anxious. She does have longstanding constipation but has not tried anything for it. She cannot swallow pills so this limits what she takes over the counter. She had googled her symptoms and was concerned about her spleen, pancreatitis, hiatal hernia, gastritis etc.  She admits that she needs to have a colonoscopy done but she is extremely nervous and has yet to schedule it. She can take mineral oil as a stool softener and miralax packets with water to help with her constipation. We discussed her abnormal thyroid testing and she will need to follow up. It is not significantly changed since the last time that it was checked several months ago.      Medications   sodium chloride 0.9 % bolus 1,000 mL (0 mL Intravenous Stopped 9/11/23 1749)   aluminum-magnesium hydroxide-simethicone (MAALOX) oral suspension 30 mL (30 mL Oral Given 9/11/23 1609)           FINAL IMPRESSION    Final diagnoses:   Constipation         DISPOSITION/PLAN    Time reflects when diagnosis was documented in both MDM as applicable and the Disposition within this note     Time User Action Codes Description Comment    9/11/2023  5:27 PM Nathaniel Kaur Add [K59.00] Constipation       ED Disposition     ED Disposition   Discharge    Condition   Stable    Date/Time   Mon Sep 11, 2023  5:22 PM    Comment   Bushra Gallego discharge to home/self care.                Follow-up Information     Follow up With Specialties Details Why [address] [facility] Emergency Medicine Go to  As needed, If symptoms worsen [address] [id number]  [address] [facility] Emergency Department, [address], Abeba Short MD Internal Medicine Schedule an appointment as soon as possible for a visit   [address] [phone]               PATIENT REFERRED TO:    [facility] Emergency Department  Naga Lara  [phone]  Go to   As needed, If symptoms worsen    Vasile Montoya MD  [address] [phone]    Schedule an appointment as soon as possible for a visit         DISCHARGE MEDICATIONS:    Discharge Medication List as of 9/11/2023  5:30 PM      START taking these medications    Details   polyethylene glycol (MIRALAX) 17 g packet Take 17 g by mouth daily, Starting Mon 9/11/2023, Normal         CONTINUE these medications which have NOT CHANGED    Details   acetaminophen (TYLENOL) 160 mg/5 mL liquid Take 10 mL (320 mg total) by mouth every 4 (four) hours as needed for fever, Starting Thu 12/29/2022, Normal      ibuprofen (MOTRIN) 100 mg/5 mL suspension Take 20 mL (400 mg total) by mouth every 6 (six) hours as needed for mild pain, Starting Thu 12/29/2022, Normal      levothyroxine 100 mcg tablet Take 100 mcg by mouth daily Takes brand name only- Synthroid, Historical Med      LORazepam (Ativan) 0.5 mg tablet Take 1 tablet (0.5 mg total) by mouth every 8 (eight) hours as needed for anxiety, Starting Wed 3/22/2023, Normal      meclizine (ANTIVERT) 25 mg tablet Take 1 tablet (25 mg total) by mouth 3 (three) times a day as needed for dizziness, Starting Wed 3/22/2023, Normal      ondansetron (ZOFRAN-ODT) 4 mg disintegrating tablet Take 1 tablet (4 mg total) by mouth every 6 (six) hours as needed for vomiting or nausea, Starting Wed 3/22/2023, Normal             No discharge procedures on file.          Missy Hannah, [address], DO  09/11/23 2459

## 2024-11-18 DIAGNOSIS — Z00.6 ENCOUNTER FOR EXAMINATION FOR NORMAL COMPARISON OR CONTROL IN CLINICAL RESEARCH PROGRAM: ICD-10-CM

## 2025-03-28 ENCOUNTER — APPOINTMENT (EMERGENCY)
Dept: RADIOLOGY | Facility: HOSPITAL | Age: 60
End: 2025-03-28
Payer: COMMERCIAL

## 2025-03-28 ENCOUNTER — HOSPITAL ENCOUNTER (EMERGENCY)
Facility: HOSPITAL | Age: 60
Discharge: HOME | End: 2025-03-28
Attending: EMERGENCY MEDICINE
Payer: COMMERCIAL

## 2025-03-28 VITALS
HEART RATE: 93 BPM | BODY MASS INDEX: 28.52 KG/M2 | OXYGEN SATURATION: 92 % | WEIGHT: 182.1 LBS | TEMPERATURE: 98 F | RESPIRATION RATE: 18 BRPM | DIASTOLIC BLOOD PRESSURE: 75 MMHG | SYSTOLIC BLOOD PRESSURE: 145 MMHG

## 2025-03-28 DIAGNOSIS — J40 BRONCHITIS: Primary | ICD-10-CM

## 2025-03-28 LAB
ATRIAL RATE: 80 BPM
FLUAV AG UPPER RESP QL IA.RAPID: NEGATIVE
FLUBV AG UPPER RESP QL IA.RAPID: NEGATIVE
P AXIS: 66 DEGREES
PR INTERVAL: 150 MS
QRS AXIS: 46 DEGREES
QRSD INTERVAL: 78 MS
QT INTERVAL: 396 MS
QTC INTERVAL: 456 MS
SARS-COV+SARS-COV-2 AG RESP QL IA.RAPID: NEGATIVE
T WAVE AXIS: 28 DEGREES
VENTRICULAR RATE: 80 BPM

## 2025-03-28 PROCEDURE — 94640 AIRWAY INHALATION TREATMENT: CPT

## 2025-03-28 PROCEDURE — 87811 SARS-COV-2 COVID19 W/OPTIC: CPT | Performed by: EMERGENCY MEDICINE

## 2025-03-28 PROCEDURE — 87804 INFLUENZA ASSAY W/OPTIC: CPT | Performed by: EMERGENCY MEDICINE

## 2025-03-28 PROCEDURE — 99285 EMERGENCY DEPT VISIT HI MDM: CPT

## 2025-03-28 PROCEDURE — 71046 X-RAY EXAM CHEST 2 VIEWS: CPT

## 2025-03-28 PROCEDURE — 99285 EMERGENCY DEPT VISIT HI MDM: CPT | Performed by: EMERGENCY MEDICINE

## 2025-03-28 PROCEDURE — 93010 ELECTROCARDIOGRAM REPORT: CPT | Performed by: INTERNAL MEDICINE

## 2025-03-28 PROCEDURE — 93005 ELECTROCARDIOGRAM TRACING: CPT

## 2025-03-28 RX ORDER — ALBUTEROL SULFATE 0.83 MG/ML
2.5 SOLUTION RESPIRATORY (INHALATION) ONCE
Status: COMPLETED | OUTPATIENT
Start: 2025-03-28 | End: 2025-03-28

## 2025-03-28 RX ORDER — ALBUTEROL SULFATE 90 UG/1
2 INHALANT RESPIRATORY (INHALATION) ONCE
Status: COMPLETED | OUTPATIENT
Start: 2025-03-28 | End: 2025-03-28

## 2025-03-28 RX ADMIN — ALBUTEROL SULFATE 2.5 MG: 2.5 SOLUTION RESPIRATORY (INHALATION) at 14:20

## 2025-03-28 RX ADMIN — ALBUTEROL SULFATE 2 PUFF: 90 AEROSOL, METERED RESPIRATORY (INHALATION) at 15:01

## 2025-03-28 NOTE — ED PROVIDER NOTES
Time reflects when diagnosis was documented in both MDM as applicable and the Disposition within this note       Time User Action Codes Description Comment    3/28/2025  2:57 PM Rachana Addison Add [J40] Bronchitis           ED Disposition       ED Disposition   Discharge    Condition   Stable    Date/Time   Fri Mar 28, 2025  2:57 PM    Comment   Le Gallego discharge to home/self care.                   Assessment & Plan       Medical Decision Making  Pt w/ focal chest wall pain w/ 1wk of coughing.  EKG done upon arrival due to chest pain complaint but doubt ACS given history. Will get CXR to r/o pneumonia, ptx, rib fracture. Will get viral swab to r/o covid/flu.  Will give albuterol neb for symptomatic treatment and re-evaluate    Problems Addressed:  Bronchitis: acute illness or injury     Details: Improved exam after neb  Cxr clear  Offered cough medication - declined.  Instructed on inhaler/spacer use and given return precautions    Amount and/or Complexity of Data Reviewed  Labs: ordered.  Radiology: ordered. Decision-making details documented in ED Course.  ECG/medicine tests: ordered and independent interpretation performed.    Risk  Prescription drug management.        ED Course as of 03/28/25 1525   Fri Mar 28, 2025   1435 XR chest 2 views  Xray reviewed and independently interpreted by me: no acute findings.  Formal reading per radiology         Medications   albuterol inhalation solution 2.5 mg (2.5 mg Nebulization Given 3/28/25 1420)   albuterol (PROVENTIL HFA,VENTOLIN HFA) inhaler 2 puff (2 puffs Inhalation Given 3/28/25 1501)       ED Risk Strat Scores                            SBIRT 20yo+      Flowsheet Row Most Recent Value   Initial Alcohol Screen: US AUDIT-C     1. How often do you have a drink containing alcohol? 0 Filed at: 03/28/2025 1340   2. How many drinks containing alcohol do you have on a typical day you are drinking?  0 Filed at: 03/28/2025 1340   3b. FEMALE Any Age, or MALE 65+:  How often do you have 4 or more drinks on one occassion? 0 Filed at: 03/28/2025 1340   Audit-C Score 0 Filed at: 03/28/2025 1340   MARLENA: How many times in the past year have you...    Used an illegal drug or used a prescription medication for non-medical reasons? Never Filed at: 03/28/2025 1340                            History of Present Illness       Chief Complaint   Patient presents with    Chest Pain     Pt reports pain under her L breast that radiates into her back- has been coughing a lot recently. Had endoscopy on 3/20.       Past Medical History:   Diagnosis Date    Asthma     Colitis     Hypothyroid     IBS (irritable bowel syndrome)       Past Surgical History:   Procedure Laterality Date    APPENDECTOMY      HYSTERECTOMY      LA LAPAROSCOPIC APPENDECTOMY N/A 8/17/2018    Procedure: APPENDECTOMY LAPAROSCOPIC;  Surgeon: Pari Henao MD;  Location: [facility];  Service: General    TONSILLECTOMY        No family history on file.   Social History     Tobacco Use    Smoking status: Every Day     Current packs/day: 1.00     Average packs/day: 1 pack/day for 42.0 years (42.0 ttl pk-yrs)     Types: Cigarettes    Smokeless tobacco: Never   Vaping Use    Vaping status: Never Used   Substance Use Topics    Alcohol use: No    Drug use: No      E-Cigarette/Vaping    E-Cigarette Use Never User       E-Cigarette/Vaping Substances    Nicotine No     THC No     CBD No     Flavoring No       I have reviewed and agree with the history as documented.     Patient presents with:  Chest Pain: Pt reports pain under her L breast that radiates into her back- has been coughing a lot recently. Had endoscopy on 3/20.    59y F here for evaluation of cough and left sided chest wall pain increased w/ coughing / deep breathing. Started w/ URI symptoms on Monday. Denies f/c/s, +mild congestion. Initially had mild sore throat that has resolved.  Reports productive sounding cough, diffuse chest tightness.  Yesterday started w/ some  focal pain up under the left breast, gradually worsening w/ cough. Notes some radiation around toward the back w/ the cough / movement.  Denies sob/aragon, no n/v/d, no myalgias/arthralgias, +fatigue.      +sick contacts - grandson w/ URI symptoms.  Pt reports hx of asthma, doesn't use her inhalers - reports she doesn't like taking/using medications.    Pt reports she had EGD/colonoscopy on 3/20 and wasn't sure if this was related b/c she read that you can get pneumonia after an EGD.  No known CAD, no hx of cath/stress test.  Hx of borderline hyperlipidemia, no htn, dm.          History provided by:  Patient   used: No    Chest Pain      Review of Systems   Cardiovascular:  Positive for chest pain.   All other systems reviewed and are negative.          Objective       ED Triage Vitals [03/28/25 1340]   Temperature Pulse Blood Pressure Respirations SpO2 Patient Position - Orthostatic VS   98 °F (36.7 °C) 82 132/66 18 96 % Lying      Temp Source Heart Rate Source BP Location FiO2 (%) Pain Score    Oral Monitor Right arm -- 4      Vitals      Date and Time Temp Pulse SpO2 Resp BP Pain Score FACES Pain Rating User   03/28/25 1500 -- 93 92 % 18 145/75 -- -- ML   03/28/25 1340 98 °F (36.7 °C) 82 96 % 18 132/66 4 -- LB            Physical Exam  Vitals and nursing note reviewed.   Constitutional:       General: She is not in acute distress.     Appearance: Normal appearance. She is not ill-appearing, toxic-appearing or diaphoretic.   HENT:      Mouth/Throat:      Mouth: Mucous membranes are moist.   Eyes:      Conjunctiva/sclera: Conjunctivae normal.   Cardiovascular:      Rate and Rhythm: Normal rate and regular rhythm.      Heart sounds: No murmur heard.  Pulmonary:      Effort: Pulmonary effort is normal.      Breath sounds: Wheezing present. No decreased breath sounds, rhonchi or rales.      Comments: Bronchospastic cough during exam w/ expiratory wheezing noted.  Musculoskeletal:         General: No  tenderness.      Cervical back: Normal range of motion.   Skin:     General: Skin is warm.   Neurological:      General: No focal deficit present.      Mental Status: She is alert.   Psychiatric:         Mood and Affect: Mood normal.         Results Reviewed       Procedure Component Value Units Date/Time    FLU/COVID Rapid Antigen (30 min. TAT) - Preferred screening test in ED [[id number]]  (Normal) Collected: 03/28/25 1405    Lab Status: Final result Specimen: Nares from Nose Updated: 03/28/25 1427     SARS COV Rapid Antigen Negative     Influenza A Rapid Antigen Negative     Influenza B Rapid Antigen Negative    Narrative:      This test has been performed using the [employer] Esther 2 FLU+SARS Antigen test under the Emergency Use Authorization (EUA). This test has been validated by the  and verified by the performing laboratory. The Esther uses lateral flow immunofluorescent sandwich assay to detect SARS-COV, Influenza A and Influenza B Antigen.     The [employer]idel Esther 2 SARS Antigen test does not differentiate between SARS-CoV and SARS-CoV-2.     Negative results are presumptive and may be confirmed with a molecular assay, if necessary, for patient management. Negative results do not rule out SARS-CoV-2 or influenza infection and should not be used as the sole basis for treatment or patient management decisions. A negative test result may occur if the level of antigen in a sample is below the limit of detection of this test.     Positive results are indicative of the presence of viral antigens, but do not rule out bacterial infection or co-infection with other viruses.     All test results should be used as an adjunct to clinical observations and other information available to the provider.    FOR PEDIATRIC PATIENTS - copy/paste COVID Guidelines URL to browser: [email or web address]            XR chest 2 views   Final Interpretation by Christoph Mcqueen MD  (03/28 1555)      No acute cardiopulmonary disease.            Workstation performed: [id number]             ECG 12 Lead Documentation Only    Date/Time: 3/28/2025 1:42 PM    Performed by: Rachana Addison DO  Authorized by: Rachana Addison DO    Indications / Diagnosis:  Pain  ECG reviewed by me, the ED Provider: yes    Patient location:  ED  Previous ECG:     Previous ECG:  Compared to current    Comparison ECG info:  09/11/2023    Similarity:  No change  Interpretation:     Interpretation: non-specific    Rate:     ECG rate:  80    ECG rate assessment: normal    Rhythm:     Rhythm: sinus rhythm    QRS:     QRS axis:  Normal  ST segments:     ST segments:  Normal  T waves:     T waves: normal    Other findings:     Other findings: poor R wave progression        ED Medication and Procedure Management   Prior to Admission Medications   Prescriptions Last Dose Informant Patient Reported? Taking?   LORazepam (Ativan) 0.5 mg tablet   No No   Sig: Take 1 tablet (0.5 mg total) by mouth every 8 (eight) hours as needed for anxiety   acetaminophen (TYLENOL) 160 mg/5 mL liquid   No No   Sig: Take 10 mL (320 mg total) by mouth every 4 (four) hours as needed for fever   ibuprofen (MOTRIN) 100 mg/5 mL suspension   No No   Sig: Take 20 mL (400 mg total) by mouth every 6 (six) hours as needed for mild pain   levothyroxine 100 mcg tablet   Yes No   Sig: Take 100 mcg by mouth daily Takes brand name only- Synthroid   meclizine (ANTIVERT) 25 mg tablet   No No   Sig: Take 1 tablet (25 mg total) by mouth 3 (three) times a day as needed for dizziness   ondansetron (ZOFRAN-ODT) 4 mg disintegrating tablet   No No   Sig: Take 1 tablet (4 mg total) by mouth every 6 (six) hours as needed for vomiting or nausea   polyethylene glycol (MIRALAX) 17 g packet   No No   Sig: Take 17 g by mouth daily      Facility-Administered Medications: None     Patient's Medications   Discharge Prescriptions    No medications on file     No discharge  procedures on file.  ED SEPSIS DOCUMENTATION   Time reflects when diagnosis was documented in both MDM as applicable and the Disposition within this note       Time User Action Codes Description Comment    3/28/2025  2:57 PM Rachana Addison Add [J40] Bronchitis                  Rachana Addison DO  03/28/25 1525

## 2025-03-28 NOTE — DISCHARGE INSTRUCTIONS
You can use your inhaler with a spacer every 4 hours as needed for cough and shortness of breath.    You can have fever for 3-5 days with a viral illness and then any additional symptoms that develop (congestion,cough, nausea, diarrhea) can last for another 7 days.      You can alternate acetaminophen and ibuprofen every three hours as needed for the fever, headache and body aches.  You can use mucinex/robitussin for cough.   Use over the counter sinus rinses (neti pot, navage, kamille med sinus) to help relieve sinus congestion / pressure. Drink plenty of fluids and rest.      Return for any persistent vomiting, trouble breathing or for any concerns.

## 2025-07-18 ENCOUNTER — HOSPITAL ENCOUNTER (EMERGENCY)
Facility: HOSPITAL | Age: 60
Discharge: HOME | End: 2025-07-18
Attending: EMERGENCY MEDICINE
Payer: COMMERCIAL

## 2025-07-18 ENCOUNTER — APPOINTMENT (EMERGENCY)
Dept: CT IMAGING | Facility: HOSPITAL | Age: 60
End: 2025-07-18
Payer: COMMERCIAL

## 2025-07-18 VITALS
OXYGEN SATURATION: 98 % | BODY MASS INDEX: 27.17 KG/M2 | HEART RATE: 84 BPM | WEIGHT: 173.5 LBS | TEMPERATURE: 98.1 F | RESPIRATION RATE: 17 BRPM | DIASTOLIC BLOOD PRESSURE: 71 MMHG | SYSTOLIC BLOOD PRESSURE: 139 MMHG

## 2025-07-18 DIAGNOSIS — R10.32 LEFT LOWER QUADRANT ABDOMINAL PAIN: Primary | ICD-10-CM

## 2025-07-18 LAB
ALBUMIN SERPL BCG-MCNC: 4 G/DL (ref 3.5–5)
ALP SERPL-CCNC: 72 U/L (ref 34–104)
ALT SERPL W P-5'-P-CCNC: 13 U/L (ref 7–52)
ANION GAP SERPL CALCULATED.3IONS-SCNC: 6 MMOL/L (ref 4–13)
AST SERPL W P-5'-P-CCNC: 14 U/L (ref 13–39)
BASOPHILS # BLD AUTO: 0.04 THOUSANDS/ÂΜL (ref 0–0.1)
BASOPHILS NFR BLD AUTO: 0 % (ref 0–1)
BILIRUB DIRECT SERPL-MCNC: 0.02 MG/DL (ref 0–0.2)
BILIRUB SERPL-MCNC: 0.29 MG/DL (ref 0.2–1)
BILIRUB UR QL STRIP: NEGATIVE
BUN SERPL-MCNC: 15 MG/DL (ref 5–25)
CALCIUM SERPL-MCNC: 9.3 MG/DL (ref 8.4–10.2)
CHLORIDE SERPL-SCNC: 107 MMOL/L (ref 96–108)
CLARITY UR: CLEAR
CO2 SERPL-SCNC: 26 MMOL/L (ref 21–32)
COLOR UR: COLORLESS
CREAT SERPL-MCNC: 0.84 MG/DL (ref 0.6–1.3)
EOSINOPHIL # BLD AUTO: 0.12 THOUSAND/ÂΜL (ref 0–0.61)
EOSINOPHIL NFR BLD AUTO: 1 % (ref 0–6)
ERYTHROCYTE [DISTWIDTH] IN BLOOD BY AUTOMATED COUNT: 13.9 % (ref 11.6–15.1)
GFR SERPL CREATININE-BSD FRML MDRD: 75 ML/MIN/1.73SQ M
GLUCOSE SERPL-MCNC: 88 MG/DL (ref 65–140)
GLUCOSE UR STRIP-MCNC: NEGATIVE MG/DL
HCT VFR BLD AUTO: 39.4 % (ref 34.8–46.1)
HGB BLD-MCNC: 12.9 G/DL (ref 11.5–15.4)
HGB UR QL STRIP.AUTO: NEGATIVE
IMM GRANULOCYTES # BLD AUTO: 0.02 THOUSAND/UL (ref 0–0.2)
IMM GRANULOCYTES NFR BLD AUTO: 0 % (ref 0–2)
KETONES UR STRIP-MCNC: NEGATIVE MG/DL
LEUKOCYTE ESTERASE UR QL STRIP: NEGATIVE
LIPASE SERPL-CCNC: 34 U/L (ref 11–82)
LYMPHOCYTES # BLD AUTO: 3.7 THOUSANDS/ÂΜL (ref 0.6–4.47)
LYMPHOCYTES NFR BLD AUTO: 35 % (ref 14–44)
MAGNESIUM SERPL-MCNC: 2.2 MG/DL (ref 1.9–2.7)
MCH RBC QN AUTO: 30.9 PG (ref 26.8–34.3)
MCHC RBC AUTO-ENTMCNC: 32.7 G/DL (ref 31.4–37.4)
MCV RBC AUTO: 94 FL (ref 82–98)
MONOCYTES # BLD AUTO: 0.86 THOUSAND/ÂΜL (ref 0.17–1.22)
MONOCYTES NFR BLD AUTO: 8 % (ref 4–12)
NEUTROPHILS # BLD AUTO: 5.97 THOUSANDS/ÂΜL (ref 1.85–7.62)
NEUTS SEG NFR BLD AUTO: 56 % (ref 43–75)
NITRITE UR QL STRIP: NEGATIVE
NRBC BLD AUTO-RTO: 0 /100 WBCS
PH UR STRIP.AUTO: 5.5 [PH]
PLATELET # BLD AUTO: 295 THOUSANDS/UL (ref 149–390)
PMV BLD AUTO: 10.2 FL (ref 8.9–12.7)
POTASSIUM SERPL-SCNC: 3.9 MMOL/L (ref 3.5–5.3)
PROT SERPL-MCNC: 6.4 G/DL (ref 6.4–8.4)
PROT UR STRIP-MCNC: NEGATIVE MG/DL
RBC # BLD AUTO: 4.18 MILLION/UL (ref 3.81–5.12)
SODIUM SERPL-SCNC: 139 MMOL/L (ref 135–147)
SP GR UR STRIP.AUTO: 1 (ref 1–1.03)
UROBILINOGEN UR STRIP-ACNC: <2 MG/DL
WBC # BLD AUTO: 10.71 THOUSAND/UL (ref 4.31–10.16)

## 2025-07-18 PROCEDURE — 81003 URINALYSIS AUTO W/O SCOPE: CPT | Performed by: EMERGENCY MEDICINE

## 2025-07-18 PROCEDURE — 85025 COMPLETE CBC W/AUTO DIFF WBC: CPT | Performed by: EMERGENCY MEDICINE

## 2025-07-18 PROCEDURE — 83735 ASSAY OF MAGNESIUM: CPT | Performed by: EMERGENCY MEDICINE

## 2025-07-18 PROCEDURE — 99284 EMERGENCY DEPT VISIT MOD MDM: CPT

## 2025-07-18 PROCEDURE — 80048 BASIC METABOLIC PNL TOTAL CA: CPT | Performed by: EMERGENCY MEDICINE

## 2025-07-18 PROCEDURE — 99284 EMERGENCY DEPT VISIT MOD MDM: CPT | Performed by: EMERGENCY MEDICINE

## 2025-07-18 PROCEDURE — 74176 CT ABD & PELVIS W/O CONTRAST: CPT

## 2025-07-18 PROCEDURE — 36415 COLL VENOUS BLD VENIPUNCTURE: CPT | Performed by: EMERGENCY MEDICINE

## 2025-07-18 PROCEDURE — 80076 HEPATIC FUNCTION PANEL: CPT | Performed by: EMERGENCY MEDICINE

## 2025-07-18 PROCEDURE — 87086 URINE CULTURE/COLONY COUNT: CPT | Performed by: EMERGENCY MEDICINE

## 2025-07-18 PROCEDURE — 83690 ASSAY OF LIPASE: CPT | Performed by: EMERGENCY MEDICINE

## 2025-07-18 PROCEDURE — 96360 HYDRATION IV INFUSION INIT: CPT

## 2025-07-18 RX ORDER — DICYCLOMINE HCL 20 MG
20 TABLET ORAL 2 TIMES DAILY
Qty: 20 TABLET | Refills: 0 | Status: SHIPPED | OUTPATIENT
Start: 2025-07-18

## 2025-07-18 RX ORDER — IBUPROFEN 100 MG/5ML
600 SUSPENSION ORAL ONCE
Status: COMPLETED | OUTPATIENT
Start: 2025-07-18 | End: 2025-07-18

## 2025-07-18 RX ADMIN — IBUPROFEN 600 MG: 100 SUSPENSION ORAL at 21:22

## 2025-07-18 RX ADMIN — SODIUM CHLORIDE 1000 ML: 0.9 INJECTION, SOLUTION INTRAVENOUS at 19:18

## 2025-07-19 NOTE — ED PROVIDER NOTES
Time reflects when diagnosis was documented in both MDM as applicable and the Disposition within this note       Time User Action Codes Description Comment    7/18/2025 10:09 PM Dario Vimal Add [R10.32] Left lower quadrant abdominal pain           ED Disposition       ED Disposition   Discharge    Condition   Stable    Date/Time   Fri Jul 18, 2025 10:09 PM    Comment   [facility] Gallego discharge to home/self care.                   Assessment & Plan       Medical Decision Making  1. Abdominal pain - Will check urine for infection, CBC for leukocytosis, metabolic panel for electrolyte abnormalities and dehydration,  LFT's to assess GB dysfunction, lipase for pancreatitis, CT abdomen and pelvis to determine presence of possible colitis, kidney stone, diverticulitis. Will order IV fluids, did offer analgesia but patient has declined.     Problems Addressed:  Left lower quadrant abdominal pain: acute illness or injury    Amount and/or Complexity of Data Reviewed  Labs: ordered.  Radiology: ordered.    Risk  Prescription drug management.             Medications   sodium chloride 0.9 % bolus 1,000 mL (0 mL Intravenous Stopped 7/18/25 2018)   ibuprofen (MOTRIN) oral suspension 600 mg (600 mg Oral Given 7/18/25 2122)       ED Risk Strat Scores                    No data recorded        SBIRT 20yo+      Flowsheet Row Most Recent Value   Initial Alcohol Screen: US AUDIT-C     1. How often do you have a drink containing alcohol? 0 Filed at: 07/18/2025 1830   2. How many drinks containing alcohol do you have on a typical day you are drinking?  0 Filed at: 07/18/2025 1830   3a. Male UNDER 65: How often do you have five or more drinks on one occasion? 0 Filed at: 07/18/2025 1830   3b. FEMALE Any Age, or MALE 65+: How often do you have 4 or more drinks on one occassion? 0 Filed at: 07/18/2025 1830   Audit-C Score 0 Filed at: 07/18/2025 1830   MARLENA: How many times in the past year have you...    Used an illegal drug or used a  prescription medication for non-medical reasons? Never Filed at: 07/18/2025 1830                            History of Present Illness       Chief Complaint   Patient presents with    Abdominal Pain     Patient arrives with c/o left lower abdominal pain. Denies N/V/D. Denies urinary symptoms.        Past Medical History[1]   Past Surgical History[2]   Family History[3]   Social History[4]   E-Cigarette/Vaping    E-Cigarette Use Never User       E-Cigarette/Vaping Substances    Nicotine No     THC No     CBD No     Flavoring No       I have reviewed and agree with the history as documented.     60 YO female presents with Left lower quadrant abdominal pain. States this has been constant for the last few days. States it has been worse with ambulation and with laying flat, she feels better with sitting up. She denies fevers, states she typically has constipation but this has improved over the last few days. Patient states she has had some difficulty with passing flatus. She has a history of appendectomy in the past as well as hysterectomy, states she does still have her ovaries. She has been taking ibuprofen with some relief. Patient has a colonoscopy 4 months prior, states she had 13 polyps removed. Pt denies CP/SOB/F/C/N/V/D/C, no dysuria, burning on urination or blood in urine.       History provided by:  Patient   used: No        Review of Systems   Constitutional:  Negative for chills, fatigue and fever.   HENT:  Negative for dental problem.    Eyes:  Negative for visual disturbance.   Respiratory:  Negative for shortness of breath.    Cardiovascular:  Negative for chest pain.   Gastrointestinal:  Positive for abdominal pain. Negative for diarrhea and vomiting.   Genitourinary:  Negative for dysuria and frequency.   Musculoskeletal:  Negative for arthralgias.   Skin:  Negative for rash.   Neurological:  Negative for dizziness, weakness and light-headedness.   Psychiatric/Behavioral:  Negative  for agitation, behavioral problems and confusion.    All other systems reviewed and are negative.          Objective       ED Triage Vitals [07/18/25 1827]   Temperature Pulse Blood Pressure Respirations SpO2 Patient Position - Orthostatic VS   98.1 °F (36.7 °C) 85 143/70 18 97 % Sitting      Temp Source Heart Rate Source BP Location FiO2 (%) Pain Score    Oral Monitor Right arm -- --      Vitals      Date and Time Temp Pulse SpO2 Resp BP Pain Score FACES Pain Rating User   07/18/25 2025 -- 84 98 % 17 139/71 -- -- RK   07/18/25 1827 98.1 °F (36.7 °C) 85 97 % 18 143/70 -- -- EK            Physical Exam  Vitals and nursing note reviewed.   Constitutional:       Appearance: Normal appearance.   HENT:      Head: Normocephalic and atraumatic.     Eyes:      Extraocular Movements: Extraocular movements intact.      Conjunctiva/sclera: Conjunctivae normal.       Cardiovascular:      Rate and Rhythm: Normal rate.   Pulmonary:      Effort: Pulmonary effort is normal.   Abdominal:      General: There is no distension.     Musculoskeletal:         General: Normal range of motion.      Cervical back: Normal range of motion.     Skin:     Findings: No rash.     Neurological:      General: No focal deficit present.      Mental Status: She is alert.      Cranial Nerves: No cranial nerve deficit.     Psychiatric:         Mood and Affect: Mood normal.         Results Reviewed       Procedure Component Value Units Date/Time    Urine culture [[id number]] Collected: 07/18/25 2003    Lab Status: Preliminary result Specimen: Urine, Clean Catch Updated: 07/19/25 1551     Urine Culture Culture too young- will reincubate    Basic metabolic panel [[id number]] Collected: 07/18/25 1915    Lab Status: Final result Specimen: Blood from Arm, Right Updated: 07/18/25 2033     Sodium 139 mmol/L      Potassium 3.9 mmol/L      Chloride 107 mmol/L      CO2 26 mmol/L      ANION GAP 6 mmol/L      BUN 15 mg/dL      Creatinine 0.84 mg/dL      Glucose 88  mg/dL      Calcium 9.3 mg/dL      eGFR 75 ml/min/1.73sq m     Narrative:      [employer] guidelines for Chronic Kidney Disease (CKD):     Stage 1 with normal or high GFR (GFR > 90 mL/min/1.73 square meters)    Stage 2 Mild CKD (GFR = 60-89 mL/min/1.73 square meters)    Stage 3A Moderate CKD (GFR = 45-59 mL/min/1.73 square meters)    Stage 3B Moderate CKD (GFR = 30-44 mL/min/1.73 square meters)    Stage 4 Severe CKD (GFR = 15-29 mL/min/1.73 square meters)    Stage 5 End Stage CKD (GFR <15 mL/min/1.73 square meters)  Note: GFR calculation is accurate only with a steady state creatinine    Hepatic function panel [[id number]]  (Normal) Collected: 07/18/25 1915    Lab Status: Final result Specimen: Blood from Arm, Right Updated: 07/18/25 2033     Total Bilirubin 0.29 mg/dL      Bilirubin, Direct 0.02 mg/dL      Alkaline Phosphatase 72 U/L      AST 14 U/L      ALT 13 U/L      Total Protein 6.4 g/dL      Albumin 4.0 g/dL     Magnesium [[id number]]  (Normal) Collected: 07/18/25 1915    Lab Status: Final result Specimen: Blood from Arm, Right Updated: 07/18/25 2033     Magnesium 2.2 mg/dL     Lipase [[id number]]  (Normal) Collected: 07/18/25 1915    Lab Status: Final result Specimen: Blood from Arm, Right Updated: 07/18/25 2033     Lipase 34 u/L     UA (URINE) with reflex to Scope [[id number]] Collected: 07/18/25 2004    Lab Status: Final result Specimen: Urine, Clean Catch Updated: 07/18/25 2013     Color, UA Colorless     Clarity, UA Clear     Specific Gravity, UA 1.004     pH, UA 5.5     Leukocytes, UA Negative     Nitrite, UA Negative     Protein, UA Negative mg/dl      Glucose, UA Negative mg/dl      Ketones, UA Negative mg/dl      Urobilinogen, UA <2.0 mg/dl      Bilirubin, UA Negative     Occult Blood, UA Negative    CBC and differential [[id number]]  (Abnormal) Collected: 07/18/25 1915    Lab Status: Final result Specimen: Blood from Arm, Right Updated: 07/18/25 2012     WBC 10.71 Thousand/uL       RBC 4.18 Million/uL      Hemoglobin 12.9 g/dL      Hematocrit 39.4 %      MCV 94 fL      MCH 30.9 pg      MCHC 32.7 g/dL      RDW 13.9 %      MPV 10.2 fL      Platelets 295 Thousands/uL      nRBC 0 /100 WBCs      Segmented % 56 %      Immature Grans % 0 %      Lymphocytes % 35 %      Monocytes % 8 %      Eosinophils Relative 1 %      Basophils Relative 0 %      Absolute Neutrophils 5.97 Thousands/µL      Absolute Immature Grans 0.02 Thousand/uL      Absolute Lymphocytes 3.70 Thousands/µL      Absolute Monocytes 0.86 Thousand/µL      Eosinophils Absolute 0.12 Thousand/µL      Basophils Absolute 0.04 Thousands/µL             CT abdomen pelvis wo contrast   Final Interpretation by Delmer Thakur DO (07/18 2148)      Mild anterior bladder wall thickening may be accentuated by underdistention. Correlate with urinalysis if clinical concern for cystitis.      Colonic diverticulosis without diverticulitis.      Otherwise, no acute intra-abdominal or pelvic pathology.      Limited study without IV or oral contrast.      Workstation performed: [id number]             Procedures    ED Medication and Procedure Management   Prior to Admission Medications   Prescriptions Last Dose Informant Patient Reported? Taking?   LORazepam (Ativan) 0.5 mg tablet   No No   Sig: Take 1 tablet (0.5 mg total) by mouth every 8 (eight) hours as needed for anxiety   acetaminophen (TYLENOL) 160 mg/5 mL liquid   No No   Sig: Take 10 mL (320 mg total) by mouth every 4 (four) hours as needed for fever   ibuprofen (MOTRIN) 100 mg/5 mL suspension   No No   Sig: Take 20 mL (400 mg total) by mouth every 6 (six) hours as needed for mild pain   levothyroxine 100 mcg tablet   Yes No   Sig: Take 100 mcg by mouth daily Takes brand name only- Synthroid   meclizine (ANTIVERT) 25 mg tablet   No No   Sig: Take 1 tablet (25 mg total) by mouth 3 (three) times a day as needed for dizziness   ondansetron (ZOFRAN-ODT) 4 mg disintegrating tablet   No No   Sig: Take 1  tablet (4 mg total) by mouth every 6 (six) hours as needed for vomiting or nausea   polyethylene glycol (MIRALAX) 17 g packet   No No   Sig: Take 17 g by mouth daily      Facility-Administered Medications: None     Discharge Medication List as of 7/18/2025 10:10 PM        START taking these medications    Details   dicyclomine (BENTYL) 20 mg tablet Take 1 tablet (20 mg total) by mouth 2 (two) times a day, Starting Fri 7/18/2025, Normal           CONTINUE these medications which have NOT CHANGED    Details   acetaminophen (TYLENOL) 160 mg/5 mL liquid Take 10 mL (320 mg total) by mouth every 4 (four) hours as needed for fever, Starting Thu 12/29/2022, Normal      ibuprofen (MOTRIN) 100 mg/5 mL suspension Take 20 mL (400 mg total) by mouth every 6 (six) hours as needed for mild pain, Starting u 12/29/2022, Normal      levothyroxine 100 mcg tablet Take 100 mcg by mouth daily Takes brand name only- Synthroid, Historical Med      LORazepam (Ativan) 0.5 mg tablet Take 1 tablet (0.5 mg total) by mouth every 8 (eight) hours as needed for anxiety, Starting Wed 3/22/2023, Normal      meclizine (ANTIVERT) 25 mg tablet Take 1 tablet (25 mg total) by mouth 3 (three) times a day as needed for dizziness, Starting Wed 3/22/2023, Normal      ondansetron (ZOFRAN-ODT) 4 mg disintegrating tablet Take 1 tablet (4 mg total) by mouth every 6 (six) hours as needed for vomiting or nausea, Starting Wed 3/22/2023, Normal      polyethylene glycol (MIRALAX) 17 g packet Take 17 g by mouth daily, Starting Mon 9/11/2023, Normal           No discharge procedures on file.  ED SEPSIS DOCUMENTATION   Time reflects when diagnosis was documented in both MDM as applicable and the Disposition within this note       Time User Action Codes Description Comment    7/18/2025 10:09 PM Vimal Bishop Add [R10.32] Left lower quadrant abdominal pain                      [1]   Past Medical History:  Diagnosis Date    Asthma     Colitis     Hypothyroid     IBS  (irritable bowel syndrome)    [2]   Past Surgical History:  Procedure Laterality Date    APPENDECTOMY      HYSTERECTOMY      UT LAPAROSCOPIC APPENDECTOMY N/A 8/17/2018    Procedure: APPENDECTOMY LAPAROSCOPIC;  Surgeon: Pari Henao MD;  Location: [facility];  Service: General    TONSILLECTOMY     [3] No family history on file.  [4]   Social History  Tobacco Use    Smoking status: Every Day     Current packs/day: 1.00     Average packs/day: 1 pack/day for 42.0 years (42.0 ttl pk-yrs)     Types: Cigarettes    Smokeless tobacco: Never   Vaping Use    Vaping status: Never Used   Substance Use Topics    Alcohol use: No    Drug use: No        Bam Mc MD  07/20/25 0847

## 2025-07-20 LAB — BACTERIA UR CULT: NORMAL

## (undated) DEVICE — SUT VICRYL 0 UR-6 27 IN J603H

## (undated) DEVICE — ETS45 RELOAD STANDARD 45MM: Brand: ENDOPATH

## (undated) DEVICE — ENDOPATH XCEL BLADELESS TROCARS WITH STABILITY SLEEVES: Brand: ENDOPATH XCEL

## (undated) DEVICE — SUT MONOCRYL 4-0 PS-2 18 IN Y496G

## (undated) DEVICE — Device: Brand: OMNICLOSE TROCAR SITE CLOSURE DEVICE

## (undated) DEVICE — GLOVE SRG BIOGEL 6.5

## (undated) DEVICE — 3000CC GUARDIAN II: Brand: GUARDIAN

## (undated) DEVICE — GLOVE INDICATOR PI UNDERGLOVE SZ 7 BLUE

## (undated) DEVICE — ADHESIVE SKN CLSR HISTOACRYL FLEX 0.5ML LF

## (undated) DEVICE — ENDOPATH ETS-FLEX45 ARTICULATING ENDOSCOPIC LINEAR CUTTER, NO RELOAD: Brand: ENDOPATH

## (undated) DEVICE — PENCIL ELECTROSURG E-Z CLEAN -0035H

## (undated) DEVICE — ENDOPOUCH RETRIEVER SPECIMEN RETRIEVAL BAGS: Brand: ENDOPOUCH RETRIEVER

## (undated) DEVICE — ENDOPATH ETS45 2.5MM RELOADS (VASCULAR/THIN): Brand: ENDOPATH

## (undated) DEVICE — PACK PBDS LAP CHOLE RF

## (undated) DEVICE — INTENDED FOR TISSUE SEPARATION, AND OTHER PROCEDURES THAT REQUIRE A SHARP SURGICAL BLADE TO PUNCTURE OR CUT.: Brand: BARD-PARKER SAFETY BLADES SIZE 11, STERILE

## (undated) DEVICE — ENDOPATH PNEUMONEEDLE INSUFFLATION NEEDLES WITH LUER LOCK CONNECTORS 120MM: Brand: ENDOPATH

## (undated) DEVICE — CHLORAPREP HI-LITE 26ML ORANGE

## (undated) DEVICE — SCD SEQUENTIAL COMPRESSION COMFORT SLEEVE MEDIUM KNEE LENGTH: Brand: KENDALL SCD

## (undated) DEVICE — ELECTRODE LAP J HOOK E-Z CLEAN 33CM-0021

## (undated) DEVICE — GLOVE SRG BIOGEL ECLIPSE 6.5

## (undated) DEVICE — ENDOPATH XCEL UNIVERSAL TROCAR STABLILITY SLEEVES: Brand: ENDOPATH XCEL

## (undated) DEVICE — SYRINGE 10ML LL